# Patient Record
Sex: FEMALE | Race: ASIAN | Employment: FULL TIME | URBAN - METROPOLITAN AREA
[De-identification: names, ages, dates, MRNs, and addresses within clinical notes are randomized per-mention and may not be internally consistent; named-entity substitution may affect disease eponyms.]

---

## 2017-07-26 ENCOUNTER — OFFICE VISIT (OUTPATIENT)
Dept: FAMILY MEDICINE CLINIC | Facility: CLINIC | Age: 37
End: 2017-07-26

## 2017-07-26 ENCOUNTER — LAB ENCOUNTER (OUTPATIENT)
Dept: LAB | Age: 37
End: 2017-07-26
Attending: FAMILY MEDICINE
Payer: COMMERCIAL

## 2017-07-26 ENCOUNTER — TELEPHONE (OUTPATIENT)
Dept: FAMILY MEDICINE CLINIC | Facility: CLINIC | Age: 37
End: 2017-07-26

## 2017-07-26 VITALS
DIASTOLIC BLOOD PRESSURE: 63 MMHG | HEIGHT: 59 IN | TEMPERATURE: 98 F | WEIGHT: 119 LBS | SYSTOLIC BLOOD PRESSURE: 93 MMHG | HEART RATE: 80 BPM | BODY MASS INDEX: 23.99 KG/M2

## 2017-07-26 DIAGNOSIS — Z00.00 PHYSICAL EXAM, ROUTINE: ICD-10-CM

## 2017-07-26 DIAGNOSIS — Z00.00 PHYSICAL EXAM, ROUTINE: Primary | ICD-10-CM

## 2017-07-26 LAB
ALBUMIN SERPL BCP-MCNC: 3.8 G/DL (ref 3.5–4.8)
ALBUMIN/GLOB SERPL: 1.2 {RATIO} (ref 1–2)
ALP SERPL-CCNC: 46 U/L (ref 32–100)
ALT SERPL-CCNC: 21 U/L (ref 14–54)
ANION GAP SERPL CALC-SCNC: 7 MMOL/L (ref 0–18)
AST SERPL-CCNC: 25 U/L (ref 15–41)
BASOPHILS # BLD: 0 K/UL (ref 0–0.2)
BASOPHILS NFR BLD: 1 %
BILIRUB SERPL-MCNC: 0.4 MG/DL (ref 0.3–1.2)
BUN SERPL-MCNC: 8 MG/DL (ref 8–20)
BUN/CREAT SERPL: 11.6 (ref 10–20)
CALCIUM SERPL-MCNC: 9.2 MG/DL (ref 8.5–10.5)
CHLORIDE SERPL-SCNC: 103 MMOL/L (ref 95–110)
CHOLEST SERPL-MCNC: 236 MG/DL (ref 110–200)
CO2 SERPL-SCNC: 27 MMOL/L (ref 22–32)
CREAT SERPL-MCNC: 0.69 MG/DL (ref 0.5–1.5)
EOSINOPHIL # BLD: 0.3 K/UL (ref 0–0.7)
EOSINOPHIL NFR BLD: 6 %
ERYTHROCYTE [DISTWIDTH] IN BLOOD BY AUTOMATED COUNT: 14.2 % (ref 11–15)
GLOBULIN PLAS-MCNC: 3.2 G/DL (ref 2.5–3.7)
GLUCOSE SERPL-MCNC: 91 MG/DL (ref 70–99)
HCT VFR BLD AUTO: 39.3 % (ref 35–48)
HDLC SERPL-MCNC: 55 MG/DL
HGB BLD-MCNC: 12.3 G/DL (ref 12–16)
LDLC SERPL CALC-MCNC: 168 MG/DL (ref 0–99)
LYMPHOCYTES # BLD: 1.8 K/UL (ref 1–4)
LYMPHOCYTES NFR BLD: 36 %
MCH RBC QN AUTO: 25.3 PG (ref 27–32)
MCHC RBC AUTO-ENTMCNC: 31.4 G/DL (ref 32–37)
MCV RBC AUTO: 80.6 FL (ref 80–100)
MONOCYTES # BLD: 0.5 K/UL (ref 0–1)
MONOCYTES NFR BLD: 10 %
NEUTROPHILS # BLD AUTO: 2.3 K/UL (ref 1.8–7.7)
NEUTROPHILS NFR BLD: 47 %
NONHDLC SERPL-MCNC: 181 MG/DL
OSMOLALITY UR CALC.SUM OF ELEC: 282 MOSM/KG (ref 275–295)
PLATELET # BLD AUTO: 241 K/UL (ref 140–400)
PMV BLD AUTO: 9.2 FL (ref 7.4–10.3)
POTASSIUM SERPL-SCNC: 4.4 MMOL/L (ref 3.3–5.1)
PROT SERPL-MCNC: 7 G/DL (ref 5.9–8.4)
RBC # BLD AUTO: 4.88 M/UL (ref 3.7–5.4)
SODIUM SERPL-SCNC: 137 MMOL/L (ref 136–144)
TRIGL SERPL-MCNC: 63 MG/DL (ref 1–149)
WBC # BLD AUTO: 4.8 K/UL (ref 4–11)

## 2017-07-26 PROCEDURE — 36415 COLL VENOUS BLD VENIPUNCTURE: CPT

## 2017-07-26 PROCEDURE — 80053 COMPREHEN METABOLIC PANEL: CPT

## 2017-07-26 PROCEDURE — 80061 LIPID PANEL: CPT

## 2017-07-26 PROCEDURE — 99395 PREV VISIT EST AGE 18-39: CPT | Performed by: FAMILY MEDICINE

## 2017-07-26 PROCEDURE — 85025 COMPLETE CBC W/AUTO DIFF WBC: CPT

## 2017-07-26 RX ORDER — ALBUTEROL SULFATE 90 UG/1
2 AEROSOL, METERED RESPIRATORY (INHALATION) EVERY 4 HOURS PRN
Qty: 3 INHALER | Refills: 1 | Status: SHIPPED | OUTPATIENT
Start: 2017-07-26 | End: 2020-11-16

## 2017-07-26 NOTE — TELEPHONE ENCOUNTER
Pharmacy asking to clarify directions , stts it should be one puff not two.        Medication Quantity Refills Start End   Mometasone Furoate (ASMANEX 30 METERED DOSES) 110 MCG/INH Inhalation Aerosol Powder, Breath Activated 3 Inhaler 1 7/26/2017    Sig :

## 2017-07-28 NOTE — PROGRESS NOTES
HPI:    Patient ID: Huma Chen is a 44year old female. Here for physical.   Also states that asthma not being controlled. Well.  Patient exercises daily and states that especially having trouble then with sob and albuterol not being enough, dry.              ASSESSMENT/PLAN:   Physical exam, routine  (primary encounter diagnosis)  Asthma not controlled - start asmanex daily and increase asmanex dose daily    Orders Placed This Encounter      Lipid Panel [E]      Comp Metabolic Panel (14) [E]

## 2017-10-31 ENCOUNTER — OFFICE VISIT (OUTPATIENT)
Dept: FAMILY MEDICINE CLINIC | Facility: CLINIC | Age: 37
End: 2017-10-31

## 2017-10-31 ENCOUNTER — TELEPHONE (OUTPATIENT)
Dept: FAMILY MEDICINE CLINIC | Facility: CLINIC | Age: 37
End: 2017-10-31

## 2017-10-31 VITALS
BODY MASS INDEX: 24.39 KG/M2 | SYSTOLIC BLOOD PRESSURE: 99 MMHG | DIASTOLIC BLOOD PRESSURE: 64 MMHG | HEIGHT: 59 IN | HEART RATE: 78 BPM | TEMPERATURE: 98 F | WEIGHT: 121 LBS

## 2017-10-31 DIAGNOSIS — R30.0 DYSURIA: Primary | ICD-10-CM

## 2017-10-31 PROCEDURE — 99213 OFFICE O/P EST LOW 20 MIN: CPT | Performed by: FAMILY MEDICINE

## 2017-10-31 PROCEDURE — 81002 URINALYSIS NONAUTO W/O SCOPE: CPT | Performed by: FAMILY MEDICINE

## 2017-10-31 RX ORDER — CIPROFLOXACIN 250 MG/1
250 TABLET, FILM COATED ORAL 2 TIMES DAILY
Qty: 10 TABLET | Refills: 0 | Status: SHIPPED | OUTPATIENT
Start: 2017-10-31 | End: 2017-11-10

## 2017-10-31 NOTE — TELEPHONE ENCOUNTER
Patient in office today said she faxed over wellness screening form last week. I informed her I do not see it in [facility]'s fax folders. Will f/u w/ her regular nurse tomorrow.

## 2017-11-02 NOTE — PROGRESS NOTES
HPI:    Patient ID: Kristina Little is a 44year old female. Dysuria, frequency, some pelvic discomfort. No fever        Review of Systems   Constitutional: Negative. Negative for fatigue and fever. Respiratory: Negative.     Cardiovascular: Negative Referrals:  None       #[id number]

## 2017-11-06 NOTE — TELEPHONE ENCOUNTER
Pt called in to follow up on form if it was received. Pt states there is a deadline when the form can be submitted. Pt requesting a call back.

## 2017-11-08 NOTE — TELEPHONE ENCOUNTER
Pt call, but pt voice mail box is full not able to leave message. Pt 2017 health provide screening form was fax to [phone] 2010 confirmation received and the original copy when for scanning.

## 2017-11-13 NOTE — TELEPHONE ENCOUNTER
Pt states need to have the health provide screening form resubmitted due to Dr. BOGGS didn't sign or date the form and the deadline is 11/15/2017 and she wont be able to get her benefits. Pt states can she please get a call when completed.

## 2017-11-14 NOTE — TELEPHONE ENCOUNTER
LM- form was just missing date and NPI, filled in and faxed/confirmed to [phone]. Will leave copy at  for her to .

## 2018-01-11 ENCOUNTER — TELEPHONE (OUTPATIENT)
Dept: FAMILY MEDICINE CLINIC | Facility: CLINIC | Age: 38
End: 2018-01-11

## 2018-01-11 DIAGNOSIS — Z23 NEED FOR TDAP VACCINATION: Primary | ICD-10-CM

## 2018-01-11 NOTE — TELEPHONE ENCOUNTER
Pt notified she has not had Tdap w/ us. Pt doesn't believe she's had anywhere else either. NV scheduled for 1/12. Dr Gonzalez lynn for Tdap? See pended order.

## 2018-01-12 ENCOUNTER — NURSE ONLY (OUTPATIENT)
Dept: FAMILY MEDICINE CLINIC | Facility: CLINIC | Age: 38
End: 2018-01-12

## 2018-01-12 DIAGNOSIS — Z23 NEED FOR TDAP VACCINATION: Primary | ICD-10-CM

## 2018-01-12 PROCEDURE — 90471 IMMUNIZATION ADMIN: CPT | Performed by: FAMILY MEDICINE

## 2018-01-12 PROCEDURE — 90715 TDAP VACCINE 7 YRS/> IM: CPT | Performed by: FAMILY MEDICINE

## 2018-01-12 NOTE — PROGRESS NOTES
Patient presents to office for TDAP vaccine. Order placed prior to appointment by patient's PCP Dr. Nasir Mortensen. Patient tolerated injection well, no reactions. VIS provided at the time of visit.

## 2018-01-26 ENCOUNTER — IMMUNIZATION (OUTPATIENT)
Dept: FAMILY MEDICINE CLINIC | Facility: CLINIC | Age: 38
End: 2018-01-26

## 2018-01-26 DIAGNOSIS — Z23 NEED FOR VACCINATION: ICD-10-CM

## 2018-01-26 PROCEDURE — G0008 ADMIN INFLUENZA VIRUS VAC: HCPCS | Performed by: NURSE PRACTITIONER

## 2018-01-26 PROCEDURE — 90686 IIV4 VACC NO PRSV 0.5 ML IM: CPT | Performed by: NURSE PRACTITIONER

## 2018-10-17 ENCOUNTER — OFFICE VISIT (OUTPATIENT)
Dept: FAMILY MEDICINE CLINIC | Facility: CLINIC | Age: 38
End: 2018-10-17
Payer: COMMERCIAL

## 2018-10-17 VITALS
WEIGHT: 127.38 LBS | HEIGHT: 59.5 IN | DIASTOLIC BLOOD PRESSURE: 67 MMHG | SYSTOLIC BLOOD PRESSURE: 102 MMHG | HEART RATE: 82 BPM | BODY MASS INDEX: 25.34 KG/M2

## 2018-10-17 DIAGNOSIS — L65.9 HAIR LOSS: Primary | ICD-10-CM

## 2018-10-17 DIAGNOSIS — Z00.00 ANNUAL PHYSICAL EXAM: ICD-10-CM

## 2018-10-17 PROCEDURE — 99395 PREV VISIT EST AGE 18-39: CPT | Performed by: FAMILY MEDICINE

## 2018-10-19 ENCOUNTER — IMMUNIZATION (OUTPATIENT)
Dept: FAMILY MEDICINE CLINIC | Facility: CLINIC | Age: 38
End: 2018-10-19
Payer: COMMERCIAL

## 2018-10-19 ENCOUNTER — TELEPHONE (OUTPATIENT)
Dept: FAMILY MEDICINE CLINIC | Facility: CLINIC | Age: 38
End: 2018-10-19

## 2018-10-19 ENCOUNTER — LAB ENCOUNTER (OUTPATIENT)
Dept: LAB | Age: 38
End: 2018-10-19
Attending: FAMILY MEDICINE
Payer: COMMERCIAL

## 2018-10-19 DIAGNOSIS — Z23 NEED FOR VACCINATION: ICD-10-CM

## 2018-10-19 DIAGNOSIS — Z00.00 ANNUAL PHYSICAL EXAM: ICD-10-CM

## 2018-10-19 DIAGNOSIS — L65.9 HAIR LOSS: ICD-10-CM

## 2018-10-19 PROCEDURE — 90471 IMMUNIZATION ADMIN: CPT | Performed by: FAMILY MEDICINE

## 2018-10-19 PROCEDURE — 83735 ASSAY OF MAGNESIUM: CPT

## 2018-10-19 PROCEDURE — 80053 COMPREHEN METABOLIC PANEL: CPT

## 2018-10-19 PROCEDURE — 85025 COMPLETE CBC W/AUTO DIFF WBC: CPT

## 2018-10-19 PROCEDURE — 84443 ASSAY THYROID STIM HORMONE: CPT

## 2018-10-19 PROCEDURE — 36415 COLL VENOUS BLD VENIPUNCTURE: CPT

## 2018-10-19 PROCEDURE — 90686 IIV4 VACC NO PRSV 0.5 ML IM: CPT | Performed by: FAMILY MEDICINE

## 2018-10-19 PROCEDURE — 86038 ANTINUCLEAR ANTIBODIES: CPT

## 2018-10-19 PROCEDURE — 80061 LIPID PANEL: CPT

## 2018-10-19 PROCEDURE — 86039 ANTINUCLEAR ANTIBODIES (ANA): CPT

## 2018-10-23 NOTE — PROGRESS NOTES
HPI:    Patient ID: Janes Alvarado is a 43year old female. Here for physical. Doing well except has diffuse hair thinning. Review of Systems   Constitutional: Negative.   Negative for activity change, appetite change, chills, diaphoresis, fati dermatology for further work up   Orders Placed This Encounter      Comp Metabolic Panel (14) [E]      Lipid Panel [E]      TSH [E]      CBC W Differential W Platelet [E]      PARK, Direct Screen [E]      Magnesium [E]      Meds This Visit:  Requested Presc

## 2018-11-05 ENCOUNTER — TELEPHONE (OUTPATIENT)
Dept: OTHER | Age: 38
End: 2018-11-05

## 2018-11-05 DIAGNOSIS — R76.8 ANA POSITIVE: Primary | ICD-10-CM

## 2018-11-05 NOTE — TELEPHONE ENCOUNTER
Patient requesting Biometric screening form be faxed to her at [phone]. Please fax today ASAP and call Patient at [phone] to advise was faxed.

## 2018-11-05 NOTE — TELEPHONE ENCOUNTER
Verified pt name and . Pt asking for test results from 10/19/18. Reviewed TSH results as noted below. Pt would like all test results, noted some abnormalities on lipid panel, please advise. Pt would like to be contacted tomorrow, 18.     Notes re

## 2018-11-06 NOTE — TELEPHONE ENCOUNTER
Faxed form. fax was successful. Left form at [address] for patient to .

## 2018-11-08 NOTE — TELEPHONE ENCOUNTER
Notified patient results normal except manuel ( may be related to hair loss and Dr Keegan Fatima would like her to see rheumatologist ) referral ordered. Also lipid panel slightly elevated diet and exerxise recommended, no med at this time.  Patient had additional

## 2018-11-09 NOTE — TELEPHONE ENCOUNTER
pt wanted to know why her chol is high. She exercises and believes she eats well. She stated that she does not eat out and she eats homemade foods. Pt stated that they eat a lot of lamb red meat and do have a lot of rice.  She also mentioned that sh

## 2018-12-06 ENCOUNTER — TELEPHONE (OUTPATIENT)
Dept: FAMILY MEDICINE CLINIC | Facility: CLINIC | Age: 38
End: 2018-12-06

## 2018-12-06 DIAGNOSIS — Z00.00 ANNUAL PHYSICAL EXAM: ICD-10-CM

## 2018-12-06 DIAGNOSIS — R79.9 ABNORMAL BLOOD CHEMISTRY TEST: Primary | ICD-10-CM

## 2019-02-13 ENCOUNTER — OFFICE VISIT (OUTPATIENT)
Dept: RHEUMATOLOGY | Facility: CLINIC | Age: 39
End: 2019-02-13
Payer: COMMERCIAL

## 2019-02-13 ENCOUNTER — APPOINTMENT (OUTPATIENT)
Dept: LAB | Facility: HOSPITAL | Age: 39
End: 2019-02-13
Attending: INTERNAL MEDICINE
Payer: COMMERCIAL

## 2019-02-13 VITALS
BODY MASS INDEX: 24.51 KG/M2 | HEART RATE: 83 BPM | HEIGHT: 59.5 IN | SYSTOLIC BLOOD PRESSURE: 100 MMHG | DIASTOLIC BLOOD PRESSURE: 68 MMHG | WEIGHT: 123.19 LBS

## 2019-02-13 DIAGNOSIS — R20.2 NUMBNESS AND TINGLING IN LEFT ARM: ICD-10-CM

## 2019-02-13 DIAGNOSIS — R76.8 POSITIVE ANA (ANTINUCLEAR ANTIBODY): Primary | ICD-10-CM

## 2019-02-13 DIAGNOSIS — R76.8 POSITIVE ANA (ANTINUCLEAR ANTIBODY): ICD-10-CM

## 2019-02-13 DIAGNOSIS — R20.0 NUMBNESS AND TINGLING IN LEFT ARM: ICD-10-CM

## 2019-02-13 PROCEDURE — 86235 NUCLEAR ANTIGEN ANTIBODY: CPT

## 2019-02-13 PROCEDURE — 86225 DNA ANTIBODY NATIVE: CPT

## 2019-02-13 PROCEDURE — 99244 OFF/OP CNSLTJ NEW/EST MOD 40: CPT | Performed by: INTERNAL MEDICINE

## 2019-02-13 PROCEDURE — 99212 OFFICE O/P EST SF 10 MIN: CPT | Performed by: INTERNAL MEDICINE

## 2019-02-13 PROCEDURE — 36415 COLL VENOUS BLD VENIPUNCTURE: CPT
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
## 2019-02-13 NOTE — PATIENT INSTRUCTIONS
- you were seen today for +PARK which is normally seen in lupus  - lupus normally you see rash, oral ulcers, kidney issues, joint pain and swelling  - if your numbness and tingling persist in your L arm would recommend an xr of neck and even an EMG (nerve t

## 2019-02-15 LAB — DSDNA AB TITR SER: <10 {TITER}

## 2019-02-19 LAB
ENA SM IGG SER QL: NEGATIVE
ENA SM+RNP AB SER QL: NEGATIVE
ENA SS-A AB SER QL IA: NEGATIVE
ENA SS-B AB SER QL IA: NEGATIVE

## 2019-05-10 ENCOUNTER — NURSE TRIAGE (OUTPATIENT)
Dept: FAMILY MEDICINE CLINIC | Facility: CLINIC | Age: 39
End: 2019-05-10

## 2019-05-10 NOTE — TELEPHONE ENCOUNTER
Action Requested: Summary for Provider     []  Critical Lab, Recommendations Needed  [] Need Additional Advice  []   FYI    []   Need Orders  [] Need Medications Sent to Pharmacy  []  Other     SUMMARY:   Appointment was offered and refused.   The patient w

## 2019-05-10 NOTE — TELEPHONE ENCOUNTER
Rx: Methylprednisolone 4 MG Dosepk  Sig: Take 6 Tablets on Day 1 as directed on package and decrease by 1 Tab each Day A Total

## 2019-05-10 NOTE — TELEPHONE ENCOUNTER
Pt informed of [facility]'s response below. Pt declines OV; states will just try following recommendations from nurse she spoke with earlier today.

## 2019-06-14 ENCOUNTER — NURSE TRIAGE (OUTPATIENT)
Dept: FAMILY MEDICINE CLINIC | Facility: CLINIC | Age: 39
End: 2019-06-14

## 2019-06-14 RX ORDER — METHYLPREDNISOLONE 4 MG/1
TABLET ORAL
Qty: 1 KIT | Refills: 0 | Status: SHIPPED | OUTPATIENT
Start: 2019-06-14 | End: 2019-08-20 | Stop reason: ALTCHOICE

## 2019-06-14 NOTE — TELEPHONE ENCOUNTER
Spoke with the patient and informed her Dr. Jacky Bahena has sent an rx to the pharmacy for the Medrol Pack.  Patient voiced understanding and agreed with the plan of care but stated the pharmacy the prescription was sent to is out of the Medrol and they are tryi

## 2019-06-14 NOTE — TELEPHONE ENCOUNTER
Action Requested: Summary for Provider     []  Critical Lab, Recommendations Needed  [x] Need Additional Advice  []   FYI    []   Need Orders  [x] Need Medications Sent to Pharmacy  []  Other     SUMMARY: Patient is requesting tx for low back pain, towards

## 2019-08-20 ENCOUNTER — OFFICE VISIT (OUTPATIENT)
Dept: FAMILY MEDICINE CLINIC | Facility: CLINIC | Age: 39
End: 2019-08-20
Payer: COMMERCIAL

## 2019-08-20 VITALS
TEMPERATURE: 98 F | WEIGHT: 123 LBS | SYSTOLIC BLOOD PRESSURE: 96 MMHG | DIASTOLIC BLOOD PRESSURE: 64 MMHG | HEIGHT: 59 IN | OXYGEN SATURATION: 99 % | RESPIRATION RATE: 16 BRPM | HEART RATE: 66 BPM | BODY MASS INDEX: 24.8 KG/M2

## 2019-08-20 DIAGNOSIS — N30.01 ACUTE CYSTITIS WITH HEMATURIA: Primary | ICD-10-CM

## 2019-08-20 LAB
MULTISTIX LOT#: ABNORMAL NUMERIC
PH, URINE: 6 (ref 4.5–8)
SPECIFIC GRAVITY: 1.01 (ref 1–1.03)
URINE-COLOR: YELLOW
UROBILINOGEN,SEMI-QN: 0.2 MG/DL (ref 0–1.9)

## 2019-08-20 PROCEDURE — 81003 URINALYSIS AUTO W/O SCOPE: CPT | Performed by: NURSE PRACTITIONER

## 2019-08-20 PROCEDURE — 99213 OFFICE O/P EST LOW 20 MIN: CPT | Performed by: NURSE PRACTITIONER

## 2019-08-20 PROCEDURE — 87086 URINE CULTURE/COLONY COUNT: CPT | Performed by: NURSE PRACTITIONER

## 2019-08-20 RX ORDER — NITROFURANTOIN 25; 75 MG/1; MG/1
100 CAPSULE ORAL 2 TIMES DAILY
Qty: 10 CAPSULE | Refills: 0 | Status: SHIPPED | OUTPATIENT
Start: 2019-08-20 | End: 2019-08-25

## 2019-08-20 NOTE — PROGRESS NOTES
CHIEF COMPLAINT:   Patient presents with:  Urinary        HPI:   Grover Leos is a 35year old female presents with symptoms of UTI. Complaining of urinary frequency, urgency, dysuria, bladder pressure for last 6-7 days.   Symptoms have been mild since BP 96/64 (BP Location: Left arm, Patient Position: Sitting, Cuff Size: adult)   Pulse 66   Temp 98.3 °F (36.8 °C) (Oral)   Resp 16   Ht 59\"   Wt 123 lb   LMP 07/24/2019 (Exact Date)   SpO2 99%   BMI 24.84 kg/m²   GENERAL: well developed, well nourished,in Urine sent for culture and sensitivity. Will adjust antibiotic if needed. The patient is asked to return or follow up with PCP in 3 days if not better. Advised to seek immediate care for worsening symptoms.   The patient indicates understanding of thes - Go to Emergency Dept  for fever >101, any vomiting, or increase in abdominal, back, or flank pain, or no improvement after 48 hours of antibiotics.   -Schedule appt with PCP or gyne if symptoms persist after completion of antibiotics,  if negative urine c The most common cause of bladder infections is bacteria from the bowels. The bacteria get onto the skin around the opening of the urethra. From there, they can get into the urine and travel up to the bladder, causing inflammation and infection.  This usuall · Urinate more often. Don't try to hold urine in for a long time. · Wear loose-fitting clothes and cotton underwear. Avoid tight-fitting pants. · Improve your diet and prevent constipation.  Eat more fresh fruit and vegetables, and fiber, and less junk an

## 2019-08-20 NOTE — PATIENT INSTRUCTIONS
If a urine culture was sent out, we will contact you with the results in 48-72 hours via phone or [facility]. If positive, then we will call in an appropriate antibiotic or change antibiotic if needed.  If negative, then you should stop antibiotics as it i -Schedule appt with PCP or gyne if symptoms persist after completion of antibiotics,  if negative urine culture, if there is possibility of STD, if vaginal/penile discharge or other symptoms.       Bladder Infection, Female (Adult)    Urine is normally does The most common cause of bladder infections is bacteria from the bowels. The bacteria get onto the skin around the opening of the urethra. From there, they can get into the urine and travel up to the bladder, causing inflammation and infection.  This usuall · Urinate more often. Don't try to hold urine in for a long time. · Wear loose-fitting clothes and cotton underwear. Avoid tight-fitting pants. · Improve your diet and prevent constipation.  Eat more fresh fruit and vegetables, and fiber, and less junk an

## 2019-10-23 ENCOUNTER — TELEPHONE (OUTPATIENT)
Dept: FAMILY MEDICINE CLINIC | Facility: CLINIC | Age: 39
End: 2019-10-23

## 2019-11-07 ENCOUNTER — LAB ENCOUNTER (OUTPATIENT)
Dept: LAB | Age: 39
End: 2019-11-07
Attending: FAMILY MEDICINE
Payer: COMMERCIAL

## 2019-11-07 DIAGNOSIS — Z00.00 ANNUAL PHYSICAL EXAM: ICD-10-CM

## 2019-11-07 PROCEDURE — 85025 COMPLETE CBC W/AUTO DIFF WBC: CPT

## 2019-11-07 PROCEDURE — 84443 ASSAY THYROID STIM HORMONE: CPT

## 2019-11-07 PROCEDURE — 80053 COMPREHEN METABOLIC PANEL: CPT

## 2019-11-07 PROCEDURE — 80061 LIPID PANEL: CPT

## 2019-11-07 PROCEDURE — 36415 COLL VENOUS BLD VENIPUNCTURE: CPT

## 2019-11-08 ENCOUNTER — OFFICE VISIT (OUTPATIENT)
Dept: FAMILY MEDICINE CLINIC | Facility: CLINIC | Age: 39
End: 2019-11-08
Payer: COMMERCIAL

## 2019-11-08 VITALS
BODY MASS INDEX: 26 KG/M2 | DIASTOLIC BLOOD PRESSURE: 63 MMHG | HEIGHT: 59 IN | HEART RATE: 70 BPM | TEMPERATURE: 98 F | SYSTOLIC BLOOD PRESSURE: 96 MMHG | WEIGHT: 129 LBS

## 2019-11-08 DIAGNOSIS — Z00.00 ANNUAL PHYSICAL EXAM: Primary | ICD-10-CM

## 2019-11-08 PROCEDURE — 99396 PREV VISIT EST AGE 40-64: CPT | Performed by: FAMILY MEDICINE

## 2019-11-08 NOTE — PROGRESS NOTES
HPI:    Patient ID: Francis Camargo is a 35year old female. HPI    Review of Systems   Constitutional: Negative. Negative for activity change, diaphoresis and fever. HENT: Negative. Respiratory: Negative.   Negative for cough, chest tightness, sh confirmed negative in the left inguinal area. Genitourinary:    Vagina and uterus normal.   No breast swelling, tenderness or discharge. Uterus is not deviated, not enlarged, not fixed and not tender.  Cervix exhibits no motion tenderness, no discharge an

## 2019-11-12 ENCOUNTER — IMMUNIZATION (OUTPATIENT)
Dept: FAMILY MEDICINE CLINIC | Facility: CLINIC | Age: 39
End: 2019-11-12
Payer: COMMERCIAL

## 2019-11-12 DIAGNOSIS — Z23 NEED FOR VACCINATION: ICD-10-CM

## 2019-11-12 PROCEDURE — 90686 IIV4 VACC NO PRSV 0.5 ML IM: CPT | Performed by: FAMILY MEDICINE

## 2019-11-12 PROCEDURE — 90471 IMMUNIZATION ADMIN: CPT | Performed by: FAMILY MEDICINE

## 2019-11-13 NOTE — TELEPHONE ENCOUNTER
----- Message from Darlean Dance, MD sent at 11/13/2019 11:45 AM CST -----  Results normal. Please call patient and send results.  Continue present management

## 2019-12-21 ENCOUNTER — OFFICE VISIT (OUTPATIENT)
Dept: FAMILY MEDICINE CLINIC | Facility: CLINIC | Age: 39
End: 2019-12-21
Payer: COMMERCIAL

## 2019-12-21 VITALS
WEIGHT: 129 LBS | DIASTOLIC BLOOD PRESSURE: 58 MMHG | TEMPERATURE: 98 F | HEIGHT: 59 IN | RESPIRATION RATE: 14 BRPM | SYSTOLIC BLOOD PRESSURE: 96 MMHG | BODY MASS INDEX: 26 KG/M2 | HEART RATE: 77 BPM

## 2019-12-21 DIAGNOSIS — N30.01 ACUTE CYSTITIS WITH HEMATURIA: Primary | ICD-10-CM

## 2019-12-21 PROCEDURE — 87086 URINE CULTURE/COLONY COUNT: CPT | Performed by: NURSE PRACTITIONER

## 2019-12-21 PROCEDURE — 87186 SC STD MICRODIL/AGAR DIL: CPT | Performed by: NURSE PRACTITIONER

## 2019-12-21 PROCEDURE — 99213 OFFICE O/P EST LOW 20 MIN: CPT | Performed by: NURSE PRACTITIONER

## 2019-12-21 PROCEDURE — 81002 URINALYSIS NONAUTO W/O SCOPE: CPT | Performed by: NURSE PRACTITIONER

## 2019-12-21 PROCEDURE — 87077 CULTURE AEROBIC IDENTIFY: CPT | Performed by: NURSE PRACTITIONER

## 2019-12-21 RX ORDER — NITROFURANTOIN 25; 75 MG/1; MG/1
100 CAPSULE ORAL 2 TIMES DAILY
Qty: 14 CAPSULE | Refills: 0 | Status: SHIPPED | OUTPATIENT
Start: 2019-12-21 | End: 2019-12-28

## 2019-12-21 NOTE — PROGRESS NOTES
CHIEF COMPLAINT:   Patient presents with:  UTI      HPI:   Rudy Saldaña is a 35year old female who presents with symptoms of UTI.  Complaining of  mild dysuria for a few days possibly a week at the most. She also reports a odor to her urine > 2 weeks RRR, no murmurs  LUNGS: clear to ausculation bilaterally, no wheezing or rhonchi  GI: BS present x 4. No hepatosplenomegaly. : no suprapubic tenderness.  No bladder distention, or CVAT     Recent Results (from the past 24 hour(s))   URINALYSIS NONAUTO W

## 2020-04-15 ENCOUNTER — NURSE TRIAGE (OUTPATIENT)
Dept: OTHER | Age: 40
End: 2020-04-15

## 2020-04-15 DIAGNOSIS — J01.80 OTHER ACUTE SINUSITIS, RECURRENCE NOT SPECIFIED: Primary | ICD-10-CM

## 2020-04-15 PROCEDURE — 99213 OFFICE O/P EST LOW 20 MIN: CPT | Performed by: FAMILY MEDICINE

## 2020-04-15 RX ORDER — AMOXICILLIN 875 MG/1
875 TABLET, COATED ORAL 2 TIMES DAILY
Qty: 20 TABLET | Refills: 0 | Status: SHIPPED | OUTPATIENT
Start: 2020-04-15 | End: 2021-06-01

## 2020-04-15 NOTE — TELEPHONE ENCOUNTER
Action Requested: Summary for Provider     []  Critical Lab, Recommendations Needed  [x] Need Additional Advice  []   FYI    []   Need Orders  [x] Need Medications Sent to Pharmacy  []  Other     SUMMARY: patient calling with left sided, temporal headache

## 2020-04-15 NOTE — TELEPHONE ENCOUNTER
Virtual Telephone Check-In    Cely Lechuga verbally consents to a Virtual/Telephone Check-In visit on 04/15/20. Patient understands and accepts financial responsibility for any deductible, co-insurance and/or co-pays associated with this service.

## 2020-07-25 ENCOUNTER — NURSE TRIAGE (OUTPATIENT)
Dept: FAMILY MEDICINE CLINIC | Facility: CLINIC | Age: 40
End: 2020-07-25

## 2020-07-25 RX ORDER — METHYLPREDNISOLONE 4 MG/1
TABLET ORAL
Qty: 1 KIT | Refills: 0 | Status: SHIPPED | OUTPATIENT
Start: 2020-07-25 | End: 2021-06-01

## 2020-07-25 NOTE — TELEPHONE ENCOUNTER
Action Requested: Summary for Provider     []  Critical Lab, Recommendations Needed  [] Need Additional Advice  []   FYI    []   Need Orders  [] Need Medications Sent to Pharmacy  []  Other     SUMMARY: Dr Sybil Pablo for [facility]: patient seeking rx for medrol deion.

## 2020-07-25 NOTE — TELEPHONE ENCOUNTER
Medrol dose pack sent in by provider. Patient informed. Verbalized understanding. methylPREDNISolone (MEDROL) 4 MG Oral Tablet Therapy Pack 1 kit 0 7/25/2020     Sig: As directed.     Sent to pharmacy as: methylPREDNISolone 4 MG Oral Tablet Therapy Pack

## 2020-10-15 ENCOUNTER — IMMUNIZATION (OUTPATIENT)
Dept: FAMILY MEDICINE CLINIC | Facility: CLINIC | Age: 40
End: 2020-10-15
Payer: COMMERCIAL

## 2020-10-15 PROCEDURE — 90686 IIV4 VACC NO PRSV 0.5 ML IM: CPT | Performed by: NURSE PRACTITIONER

## 2020-10-15 PROCEDURE — 90471 IMMUNIZATION ADMIN: CPT | Performed by: NURSE PRACTITIONER

## 2020-11-10 ENCOUNTER — LAB ENCOUNTER (OUTPATIENT)
Dept: LAB | Age: 40
End: 2020-11-10
Attending: FAMILY MEDICINE
Payer: COMMERCIAL

## 2020-11-10 ENCOUNTER — OFFICE VISIT (OUTPATIENT)
Dept: FAMILY MEDICINE CLINIC | Facility: CLINIC | Age: 40
End: 2020-11-10
Payer: COMMERCIAL

## 2020-11-10 VITALS
WEIGHT: 127 LBS | HEART RATE: 72 BPM | HEIGHT: 59 IN | DIASTOLIC BLOOD PRESSURE: 57 MMHG | BODY MASS INDEX: 25.6 KG/M2 | SYSTOLIC BLOOD PRESSURE: 99 MMHG

## 2020-11-10 DIAGNOSIS — Z00.00 ANNUAL PHYSICAL EXAM: Primary | ICD-10-CM

## 2020-11-10 DIAGNOSIS — Z12.31 SCREENING MAMMOGRAM, ENCOUNTER FOR: ICD-10-CM

## 2020-11-10 DIAGNOSIS — Z12.39 ENCOUNTER FOR SCREENING BREAST EXAMINATION: ICD-10-CM

## 2020-11-10 DIAGNOSIS — Z00.00 ANNUAL PHYSICAL EXAM: ICD-10-CM

## 2020-11-10 PROCEDURE — 3078F DIAST BP <80 MM HG: CPT | Performed by: FAMILY MEDICINE

## 2020-11-10 PROCEDURE — 3074F SYST BP LT 130 MM HG: CPT | Performed by: FAMILY MEDICINE

## 2020-11-10 PROCEDURE — 36415 COLL VENOUS BLD VENIPUNCTURE: CPT

## 2020-11-10 PROCEDURE — 80053 COMPREHEN METABOLIC PANEL: CPT

## 2020-11-10 PROCEDURE — 80061 LIPID PANEL: CPT

## 2020-11-10 PROCEDURE — 99396 PREV VISIT EST AGE 40-64: CPT | Performed by: FAMILY MEDICINE

## 2020-11-10 PROCEDURE — 99072 ADDL SUPL MATRL&STAF TM PHE: CPT | Performed by: FAMILY MEDICINE

## 2020-11-10 PROCEDURE — 85025 COMPLETE CBC W/AUTO DIFF WBC: CPT

## 2020-11-10 PROCEDURE — 3008F BODY MASS INDEX DOCD: CPT | Performed by: FAMILY MEDICINE

## 2020-11-10 NOTE — PROGRESS NOTES
HPI:    Patient ID: Jolly Live is a 38year old female. Doing well. No complaints  HPI    Review of Systems  Constitutional: Negative. Negative for activity change, appetite change, diaphoresis and fatigue. Respiratory: Negative.   Negative for Referrals:  SAMMIE SCREENING BILAT ([id number])       [id number]

## 2020-11-12 ENCOUNTER — TELEPHONE (OUTPATIENT)
Dept: FAMILY MEDICINE CLINIC | Facility: CLINIC | Age: 40
End: 2020-11-12

## 2020-11-12 NOTE — TELEPHONE ENCOUNTER
Pt states she was seen in office on 11/10/2020 and gave biometric form to be completed by doctor. States information needs to be faxed by tomorrow. Pt also asking for test results. Please advise.

## 2020-11-16 ENCOUNTER — TELEPHONE (OUTPATIENT)
Dept: FAMILY MEDICINE CLINIC | Facility: CLINIC | Age: 40
End: 2020-11-16

## 2020-11-16 DIAGNOSIS — E78.5 HYPERLIPIDEMIA, UNSPECIFIED HYPERLIPIDEMIA TYPE: Primary | ICD-10-CM

## 2020-11-16 RX ORDER — ATORVASTATIN CALCIUM 10 MG/1
10 TABLET, FILM COATED ORAL DAILY
Qty: 90 TABLET | Refills: 1 | Status: SHIPPED | OUTPATIENT
Start: 2020-11-16 | End: 2021-11-10

## 2020-11-16 RX ORDER — MOMETASONE FUROATE 110 UG/1
2 INHALANT RESPIRATORY (INHALATION) 2 TIMES DAILY
Qty: 3 INHALER | Refills: 1 | Status: SHIPPED | OUTPATIENT
Start: 2020-11-16 | End: 2021-11-10

## 2020-11-16 RX ORDER — ALBUTEROL SULFATE 90 UG/1
2 AEROSOL, METERED RESPIRATORY (INHALATION) EVERY 4 HOURS PRN
Qty: 3 INHALER | Refills: 1 | Status: SHIPPED | OUTPATIENT
Start: 2020-11-16 | End: 2021-11-10

## 2020-11-26 ENCOUNTER — TELEPHONE (OUTPATIENT)
Dept: FAMILY MEDICINE CLINIC | Facility: CLINIC | Age: 40
End: 2020-11-26

## 2020-11-26 RX ORDER — NITROFURANTOIN 25; 75 MG/1; MG/1
100 CAPSULE ORAL 2 TIMES DAILY
Qty: 14 CAPSULE | Refills: 0 | Status: SHIPPED | OUTPATIENT
Start: 2020-11-26 | End: 2021-06-01

## 2020-11-26 NOTE — TELEPHONE ENCOUNTER
Patient paged provider at 10:05 a.m. for UTI symptoms. She has discomfort while urinating. Frequency of urination. No fever/chills. No hematuria noted. No flank/back pain. No allergies to medications noted.  She would like an antibiotics sent over as she ge

## 2020-12-04 ENCOUNTER — TELEPHONE (OUTPATIENT)
Dept: FAMILY MEDICINE CLINIC | Facility: CLINIC | Age: 40
End: 2020-12-04

## 2020-12-04 NOTE — TELEPHONE ENCOUNTER
Pharmacist from [facility] calling to clarify if you would like the Asmanex twisthaler or HFA inhaler for this pt.      Please advise

## 2020-12-22 ENCOUNTER — HOSPITAL ENCOUNTER (OUTPATIENT)
Dept: MAMMOGRAPHY | Age: 40
Discharge: HOME | End: 2020-12-22
Attending: FAMILY MEDICINE
Payer: COMMERCIAL

## 2020-12-22 DIAGNOSIS — Z12.31 SCREENING MAMMOGRAM, ENCOUNTER FOR: ICD-10-CM

## 2020-12-22 PROCEDURE — 77063 BREAST TOMOSYNTHESIS BI: CPT | Performed by: FAMILY MEDICINE

## 2020-12-22 PROCEDURE — 77067 SCR MAMMO BI INCL CAD: CPT | Performed by: FAMILY MEDICINE

## 2021-03-19 ENCOUNTER — NURSE TRIAGE (OUTPATIENT)
Dept: FAMILY MEDICINE CLINIC | Facility: CLINIC | Age: 41
End: 2021-03-19

## 2021-03-19 RX ORDER — METHYLPREDNISOLONE 4 MG/1
TABLET ORAL
Qty: 1 KIT | Refills: 0 | Status: SHIPPED | OUTPATIENT
Start: 2021-03-19 | End: 2021-06-01

## 2021-03-19 NOTE — TELEPHONE ENCOUNTER
pt stated that she has a rash and it started three days ago the rash is on her hands, legs ,arms . Not in the face, chest, or stomach area. The rash is modearly itchy and a little red. Rash is not raised.  Pt stated that she is allergic to shellfi

## 2021-03-20 ENCOUNTER — HOSPITAL ENCOUNTER (OUTPATIENT)
Age: 41
Discharge: HOME | End: 2021-03-20
Payer: COMMERCIAL

## 2021-03-20 VITALS
SYSTOLIC BLOOD PRESSURE: 119 MMHG | RESPIRATION RATE: 16 BRPM | TEMPERATURE: 98 F | DIASTOLIC BLOOD PRESSURE: 63 MMHG | HEART RATE: 80 BPM | OXYGEN SATURATION: 100 %

## 2021-03-20 DIAGNOSIS — R21 RASH OF BODY: Primary | ICD-10-CM

## 2021-03-20 DIAGNOSIS — L50.9 URTICARIA: ICD-10-CM

## 2021-03-20 PROCEDURE — 99213 OFFICE O/P EST LOW 20 MIN: CPT | Performed by: NURSE PRACTITIONER

## 2021-03-20 RX ORDER — PREDNISONE 20 MG/1
40 TABLET ORAL DAILY
Qty: 8 TABLET | Refills: 0 | Status: SHIPPED | OUTPATIENT
Start: 2021-03-21 | End: 2021-03-25

## 2021-03-20 RX ORDER — PREDNISONE 20 MG/1
60 TABLET ORAL ONCE
Status: COMPLETED | OUTPATIENT
Start: 2021-03-20 | End: 2021-03-20

## 2021-03-20 RX ORDER — EPINEPHRINE 0.3 MG/.3ML
0.3 INJECTION SUBCUTANEOUS ONCE AS NEEDED
Qty: 1 EACH | Refills: 0 | Status: SHIPPED | OUTPATIENT
Start: 2021-03-20 | End: 2021-03-20

## 2021-03-20 NOTE — TELEPHONE ENCOUNTER
Patient waiting for call back. Patient is requesting message to be sent to  and to speak with someone. Had Rock Dewayne shot 2/28/21, 1st dose and is wondering if this rash to her body is related. Patient also advised to UC or ER for her symptoms.

## 2021-03-20 NOTE — ED PROVIDER NOTES
Patient Seen in: [facility]      History   Patient presents with:  Rash Skin Problem    Stated Complaint: Rash    HPI/Subjective:   HPI    This is a 36-year female presenting for rash. Patient states, 7 days ago she developed a rash.   Monika °C) (Temporal)   Resp 16   LMP 03/01/2021 (Exact Date)   SpO2 100%         Physical Exam  Vitals and nursing note reviewed. Constitutional:       Appearance: Normal appearance. HENT:      Head: Normocephalic.       Right Ear: External ear normal.      L instructions.                          Disposition and Plan     Clinical Impression:  Rash of body  (primary encounter diagnosis)  Urticaria    Disposition:  Discharge  3/20/2021  2:24 pm    Follow-up:  Swathi Gardner, [id number] [id number]

## 2021-06-01 ENCOUNTER — OFFICE VISIT (OUTPATIENT)
Dept: FAMILY MEDICINE CLINIC | Facility: CLINIC | Age: 41
End: 2021-06-01
Payer: COMMERCIAL

## 2021-06-01 VITALS
WEIGHT: 123 LBS | OXYGEN SATURATION: 100 % | HEIGHT: 59 IN | TEMPERATURE: 97 F | RESPIRATION RATE: 18 BRPM | SYSTOLIC BLOOD PRESSURE: 105 MMHG | BODY MASS INDEX: 24.8 KG/M2 | DIASTOLIC BLOOD PRESSURE: 64 MMHG | HEART RATE: 68 BPM

## 2021-06-01 DIAGNOSIS — N30.01 ACUTE CYSTITIS WITH HEMATURIA: Primary | ICD-10-CM

## 2021-06-01 DIAGNOSIS — R39.9 UTI SYMPTOMS: ICD-10-CM

## 2021-06-01 PROCEDURE — 3078F DIAST BP <80 MM HG: CPT | Performed by: NURSE PRACTITIONER

## 2021-06-01 PROCEDURE — 99213 OFFICE O/P EST LOW 20 MIN: CPT | Performed by: NURSE PRACTITIONER

## 2021-06-01 PROCEDURE — 81003 URINALYSIS AUTO W/O SCOPE: CPT | Performed by: NURSE PRACTITIONER

## 2021-06-01 PROCEDURE — 87086 URINE CULTURE/COLONY COUNT: CPT | Performed by: NURSE PRACTITIONER

## 2021-06-01 PROCEDURE — 3074F SYST BP LT 130 MM HG: CPT | Performed by: NURSE PRACTITIONER

## 2021-06-01 PROCEDURE — 3008F BODY MASS INDEX DOCD: CPT | Performed by: NURSE PRACTITIONER

## 2021-06-01 RX ORDER — NITROFURANTOIN 25; 75 MG/1; MG/1
100 CAPSULE ORAL 2 TIMES DAILY
Qty: 14 CAPSULE | Refills: 0 | Status: SHIPPED | OUTPATIENT
Start: 2021-06-01 | End: 2021-06-08

## 2021-06-01 NOTE — PROGRESS NOTES
CHIEF COMPLAINT:   Patient presents with:  UTI: X 2 days, frquency and urgency,  no dysuria. No fever, back pain, or nausea      HPI:   Aura Padilla is a 42year old female who presents with symptoms of UTI.      Complaining of urinary frequency and Use      Vaping Use: Never used    Alcohol use: Yes      Comment: rarely    Drug use: No        REVIEW OF SYSTEMS:   GENERAL: Denies fever, chills, body aches, fatigue, weight loss, or night sweats  SKIN: no rashes, no skin wounds or ulcers.   EYES:denies b Meds & Refills for this Visit:  Requested Prescriptions     Signed Prescriptions Disp Refills   • Nitrofurantoin Monohyd Macro 100 MG Oral Cap 14 capsule 0     Sig: Take 1 capsule (100 mg total) by mouth 2 (two) times daily for 7 days.        Will send parts of the body. Once they are in your urinary tract, they can cause infection in these areas:  · The urethra (urethritis)  · The bladder (cystitis)  · The kidneys (pyelonephritis)  The most common place for an infection is in the bladder.  This is called (dehydration)  · Constipation  · Having sex  · Using a diaphragm for birth control   Treatment  Bladder infections are diagnosed by a urine test and urine culture. They are treated with antibiotics. They often clear up quickly without problems.  Treatment h birth control pills and have frequent bladder infections, discuss it with your healthcare provider. Follow-up care  Call your healthcare provider if all symptoms are not gone after 3 days of treatment.  This is especially important if you have repeat infec take a dose on time, take it as soon as you remember. If it is almost time for the next dose, do not take the missed dose. Return to your normal dosing schedule. Do not take 2 doses of this medicine at one time.   Please tell your doctor and pharmacist abou about all the medicines you take. Do not start or stop any other medicines without first speaking to your doctor or pharmacist.  Call your doctor right away if you notice any unusual bleeding or bruising.   Do not share this medicine with anyone who has no this code on your smartphone or tablet or use the web address below. You can also ask your pharmacist for a printout.  If you have any questions, please ask your pharmacist.   © 2021 [address] patient indicates understanding of t

## 2021-06-01 NOTE — PATIENT INSTRUCTIONS
· Complete full course of antibiotics. · We will call you in 2-3 days with the results of your urine culture.   · Over the counter Tylenol (acetaminophen) or Advil/Motrin (ibuprofen) can be taken according to package instructions as needed for pain/discomf cystitis is an infection. Symptoms  The infection causes inflammation in the urethra and bladder. This causes many of the symptoms.  The most common symptoms of a bladder infection are:  · Pain or burning when urinating  · Having to urinate more often than have long-term (chronic) liver or kidney disease, talk with your healthcare provider before using these medicines. Also talk with your provider if you've ever had a stomach ulcer or GI (gastrointestinal) bleeding, or are taking blood-thinner medicines.   · rate  When to get medical advice  Call your healthcare provider right away if any of these occur:  · Fever of 100.4ºF (38. 0ºC) or higher, or as directed by your healthcare provider  · Symptoms are not better after 3 days of treatment  · Back or belly pain is prescribed. Please do not stop the medicine even if you start to feel better after the first few days. Cautions  Tell your doctor and pharmacist if you ever had an allergic reaction to a medicine.  Symptoms of an allergic reaction can include trouble br infection  · vomiting  Call your doctor or get medical help right away if you notice any of these more serious side effects:  · unusual bruising or discoloration on skin  · chest pain  · confusion  · severe, watery or bloody diarrhea  · fever or chills  ·

## 2021-08-17 ENCOUNTER — TELEMEDICINE (OUTPATIENT)
Dept: FAMILY MEDICINE CLINIC | Facility: CLINIC | Age: 41
End: 2021-08-17

## 2021-08-17 DIAGNOSIS — B34.9 VIRAL SYNDROME: Primary | ICD-10-CM

## 2021-08-17 PROCEDURE — 99213 OFFICE O/P EST LOW 20 MIN: CPT | Performed by: FAMILY MEDICINE

## 2021-08-17 RX ORDER — MONTELUKAST SODIUM 10 MG/1
10 TABLET ORAL DAILY
Qty: 30 TABLET | Refills: 1 | Status: SHIPPED | OUTPATIENT
Start: 2021-08-17 | End: 2021-09-08

## 2021-08-17 RX ORDER — AZITHROMYCIN 250 MG/1
TABLET, FILM COATED ORAL
Qty: 6 TABLET | Refills: 0 | Status: SHIPPED | OUTPATIENT
Start: 2021-08-17 | End: 2021-08-22

## 2021-08-17 NOTE — PROGRESS NOTES
HPI/Subjective:   Patient ID: Mary Adams is a 42year old female.   Telehealth outside of [facility] [address] Verbal Consent   I conducted a telehealth visit with Mary Adams today, 08/17/21, which was completed using two-way, real-time interacti feels slightly sick also. History/Other:   Review of Systems     Constitutional: Negative. Negative for activity change, appetite change, diaphoresis and fatigue. hEENT see above   Respiratory: see above Cardiovascular: Negative.   Negative for guera

## 2021-09-08 RX ORDER — MONTELUKAST SODIUM 10 MG/1
10 TABLET ORAL DAILY
Qty: 30 TABLET | Refills: 1 | Status: SHIPPED | OUTPATIENT
Start: 2021-09-08 | End: 2021-11-10

## 2021-09-08 NOTE — TELEPHONE ENCOUNTER
Refill passed per [facility] protocol.     Requested Prescriptions   Pending Prescriptions Disp Refills    MONTELUKAST 10 MG Oral Tab [Pharmacy Med Name: MONTELUKAST SOD 10 MG TABLET] 30 tablet 1     Sig: TAKE 1 TABLET BY MOUTH EVERY DAY        Asthma

## 2021-09-22 ENCOUNTER — TELEPHONE (OUTPATIENT)
Dept: FAMILY MEDICINE CLINIC | Facility: CLINIC | Age: 41
End: 2021-09-22

## 2021-09-29 ENCOUNTER — OFFICE VISIT (OUTPATIENT)
Dept: FAMILY MEDICINE CLINIC | Facility: CLINIC | Age: 41
End: 2021-09-29
Payer: COMMERCIAL

## 2021-09-29 VITALS
BODY MASS INDEX: 24.19 KG/M2 | HEART RATE: 79 BPM | WEIGHT: 120 LBS | SYSTOLIC BLOOD PRESSURE: 100 MMHG | DIASTOLIC BLOOD PRESSURE: 66 MMHG | HEIGHT: 59 IN | RESPIRATION RATE: 16 BRPM

## 2021-09-29 DIAGNOSIS — M79.631 RIGHT FOREARM PAIN: Primary | ICD-10-CM

## 2021-09-29 PROCEDURE — 3008F BODY MASS INDEX DOCD: CPT | Performed by: FAMILY MEDICINE

## 2021-09-29 PROCEDURE — A4467 BELT STRAP SLEEV GRMNT COVER: HCPCS | Performed by: FAMILY MEDICINE

## 2021-09-29 PROCEDURE — 3078F DIAST BP <80 MM HG: CPT | Performed by: FAMILY MEDICINE

## 2021-09-29 PROCEDURE — 3074F SYST BP LT 130 MM HG: CPT | Performed by: FAMILY MEDICINE

## 2021-09-29 PROCEDURE — 99213 OFFICE O/P EST LOW 20 MIN: CPT | Performed by: FAMILY MEDICINE

## 2021-09-29 RX ORDER — MELOXICAM 7.5 MG/1
7.5 TABLET ORAL DAILY
Qty: 30 TABLET | Refills: 0 | Status: SHIPPED | OUTPATIENT
Start: 2021-09-29 | End: 2021-10-28

## 2021-09-29 NOTE — PROGRESS NOTES
Subjective:   Patient ID: Janes Alvarado is a 42year old female. Has pain in the forearm right side.  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

## 2021-10-28 RX ORDER — MELOXICAM 7.5 MG/1
7.5 TABLET ORAL DAILY
Qty: 30 TABLET | Refills: 0 | Status: SHIPPED | OUTPATIENT
Start: 2021-10-28 | End: 2021-11-10

## 2021-10-28 NOTE — TELEPHONE ENCOUNTER
Refill passed per [place] [employer] [facility] protocol.     Requested Prescriptions   Pending Prescriptions Disp Refills    MELOXICAM 7.5 MG Oral Tab [Pharmacy Med Name: MELOXICAM 7.5 MG TABLET] 30 tablet 0     Sig: TAKE 1 TABLET BY MOUTH EVERY DAY        Non-Narcotic Pain Medication Protocol Passed - 10/28/2021 12:00 AM        Passed - Appointment in past 6 or next 3 months              Future Appointments         Provider Department Appt Notes    In 2 weeks Malinda Dorantes MD [facility] [address] [facility] Cholesterol            Recent Outpatient Visits              4 weeks ago Right forearm pain    Noah Pederson MD    Office Visit    2 months ago Viral syndrome    [facility] [address] [facility]    Telemedicine    4 months ago Acute cystitis with hematuria    [facility] FRANKIE Villa    Office Visit    11 months ago Annual physical exam    Noah Pederson MD    Office Visit    1 year ago Acute cystitis with hematuria    [address] Kaleb Barajas APRN    Office Visit

## 2021-11-10 ENCOUNTER — LAB ENCOUNTER (OUTPATIENT)
Dept: LAB | Age: 41
End: 2021-11-10
Attending: FAMILY MEDICINE
Payer: COMMERCIAL

## 2021-11-10 ENCOUNTER — OFFICE VISIT (OUTPATIENT)
Dept: FAMILY MEDICINE CLINIC | Facility: CLINIC | Age: 41
End: 2021-11-10
Payer: COMMERCIAL

## 2021-11-10 VITALS
HEART RATE: 68 BPM | WEIGHT: 131 LBS | HEIGHT: 59 IN | BODY MASS INDEX: 26.41 KG/M2 | SYSTOLIC BLOOD PRESSURE: 88 MMHG | DIASTOLIC BLOOD PRESSURE: 58 MMHG

## 2021-11-10 DIAGNOSIS — E78.5 HYPERLIPIDEMIA, UNSPECIFIED HYPERLIPIDEMIA TYPE: ICD-10-CM

## 2021-11-10 DIAGNOSIS — Z00.00 ANNUAL PHYSICAL EXAM: ICD-10-CM

## 2021-11-10 DIAGNOSIS — Z00.00 ANNUAL PHYSICAL EXAM: Primary | ICD-10-CM

## 2021-11-10 PROCEDURE — 84443 ASSAY THYROID STIM HORMONE: CPT

## 2021-11-10 PROCEDURE — 82248 BILIRUBIN DIRECT: CPT

## 2021-11-10 PROCEDURE — 36415 COLL VENOUS BLD VENIPUNCTURE: CPT

## 2021-11-10 PROCEDURE — 3074F SYST BP LT 130 MM HG: CPT | Performed by: FAMILY MEDICINE

## 2021-11-10 PROCEDURE — 85025 COMPLETE CBC W/AUTO DIFF WBC: CPT

## 2021-11-10 PROCEDURE — 3008F BODY MASS INDEX DOCD: CPT | Performed by: FAMILY MEDICINE

## 2021-11-10 PROCEDURE — 99396 PREV VISIT EST AGE 40-64: CPT | Performed by: FAMILY MEDICINE

## 2021-11-10 PROCEDURE — 3078F DIAST BP <80 MM HG: CPT | Performed by: FAMILY MEDICINE

## 2021-11-10 PROCEDURE — 90471 IMMUNIZATION ADMIN: CPT | Performed by: FAMILY MEDICINE

## 2021-11-10 PROCEDURE — 80061 LIPID PANEL: CPT

## 2021-11-10 PROCEDURE — 80053 COMPREHEN METABOLIC PANEL: CPT

## 2021-11-10 PROCEDURE — 90686 IIV4 VACC NO PRSV 0.5 ML IM: CPT | Performed by: FAMILY MEDICINE

## 2021-11-10 RX ORDER — MONTELUKAST SODIUM 10 MG/1
10 TABLET ORAL DAILY
Qty: 90 TABLET | Refills: 1 | Status: SHIPPED | OUTPATIENT
Start: 2021-11-10

## 2021-11-10 RX ORDER — MULTIVIT WITH CALCIUM,IRON,MIN 18MG-0.4MG
TABLET ORAL
COMMUNITY

## 2021-11-10 RX ORDER — MOMETASONE FUROATE 110 UG/1
2 INHALANT RESPIRATORY (INHALATION) 2 TIMES DAILY
Qty: 3 EACH | Refills: 1 | Status: SHIPPED | OUTPATIENT
Start: 2021-11-10 | End: 2021-11-13

## 2021-11-10 RX ORDER — BIOTIN 1 MG
1000 TABLET ORAL 3 TIMES DAILY
COMMUNITY

## 2021-11-10 RX ORDER — ALBUTEROL SULFATE 90 UG/1
2 AEROSOL, METERED RESPIRATORY (INHALATION) EVERY 4 HOURS PRN
Qty: 3 EACH | Refills: 1 | Status: SHIPPED | OUTPATIENT
Start: 2021-11-10 | End: 2022-02-01

## 2021-11-10 NOTE — PROGRESS NOTES
Subjective:   Patient ID: Lindsey Connelly is a 42year old female. Here for yearly physical. Doing well overall   Asthma controlled       History/Other:   Review of Systems     Constitutional: Negative.   Negative for activity change, appetite change, d person, place, and time. Deep Tendon Reflexes: Reflexes are normal and symmetric.          Assessment & Plan:   Annual physical exam  (primary encounter diagnosis)  cpm     Orders Placed This Encounter      Comp Metabolic Panel (14)      Lipid Panel

## 2021-11-11 ENCOUNTER — TELEPHONE (OUTPATIENT)
Dept: FAMILY MEDICINE CLINIC | Facility: CLINIC | Age: 41
End: 2021-11-11

## 2021-11-11 NOTE — TELEPHONE ENCOUNTER
Cody Ramos from [address] needs directions/ clearification on the following medication. Would like to know if Dr. Yaw Baez wants airfill or powder.     Mometasone Furoate (ASMANEX, 30 METERED

## 2021-11-13 RX ORDER — MOMETASONE FUROATE 110 UG/1
2 INHALANT RESPIRATORY (INHALATION) 2 TIMES DAILY
Qty: 3 EACH | Refills: 1 | Status: SHIPPED | OUTPATIENT
Start: 2021-11-13

## 2021-11-24 ENCOUNTER — TELEPHONE (OUTPATIENT)
Dept: FAMILY MEDICINE CLINIC | Facility: CLINIC | Age: 41
End: 2021-11-24

## 2021-11-24 RX ORDER — MOMETASONE FUROATE 110 UG/1
2 INHALANT RESPIRATORY (INHALATION) 2 TIMES DAILY
Qty: 3 EACH | Refills: 1 | Status: CANCELLED | OUTPATIENT
Start: 2021-11-24

## 2021-11-24 NOTE — TELEPHONE ENCOUNTER
Pharmacist indicated that the asmanex 110mcg does not have enough doses for 2 puffs 2 times daily. asmanex 110mcg is only for directions of 1 puff once daily. The asmanex 100mcg HFA can do 2 puffs 2 times daily. Rx pended for asmanex 100mcg HFA.  Please adv

## 2021-11-26 RX ORDER — DEXAMETHASONE 4 MG/1
2 TABLET ORAL 2 TIMES DAILY
Qty: 12 G | Refills: 1 | Status: SHIPPED | OUTPATIENT
Start: 2021-11-26

## 2021-11-26 NOTE — TELEPHONE ENCOUNTER
Dr. Saenz Nivia - Routed to you as patient has not been able to get this medication yet, due to other issues, but now because of back-order. Thank you for your help. Rx pended. Please double check refill # thank you.    Please reply to pool: EM TRIAGE SUPPORT

## 2021-11-26 NOTE — TELEPHONE ENCOUNTER
Rx was refilled on 11/24     Disp Refills Start End    Mometasone Furoate 100 MCG/ACT Inhalation Aerosol 3 each 1 11/24/2021     Sig - Route: Inhale 2 puffs into the lungs 2 (two) times a day.  - Inhalation    Sent to pharmacy as: Mometasone Furoate 100 M

## 2022-02-01 ENCOUNTER — TELEPHONE (OUTPATIENT)
Dept: FAMILY MEDICINE CLINIC | Facility: CLINIC | Age: 42
End: 2022-02-01

## 2022-02-01 RX ORDER — ALBUTEROL SULFATE 90 UG/1
2 AEROSOL, METERED RESPIRATORY (INHALATION) EVERY 4 HOURS PRN
Qty: 3 EACH | Refills: 1 | Status: SHIPPED | OUTPATIENT
Start: 2022-02-01

## 2022-02-01 RX ORDER — ALBUTEROL SULFATE 90 UG/1
2 AEROSOL, METERED RESPIRATORY (INHALATION) EVERY 4 HOURS PRN
Qty: 3 EACH | Refills: 1 | Status: SHIPPED | OUTPATIENT
Start: 2022-02-01 | End: 2022-02-01

## 2022-03-21 ENCOUNTER — HOSPITAL ENCOUNTER (OUTPATIENT)
Dept: MAMMOGRAPHY | Age: 42
Discharge: HOME | End: 2022-03-21
Attending: FAMILY MEDICINE
Payer: COMMERCIAL

## 2022-03-21 DIAGNOSIS — Z00.00 ANNUAL PHYSICAL EXAM: ICD-10-CM

## 2022-03-21 PROCEDURE — 77063 BREAST TOMOSYNTHESIS BI: CPT | Performed by: FAMILY MEDICINE

## 2022-03-21 PROCEDURE — 77067 SCR MAMMO BI INCL CAD: CPT | Performed by: FAMILY MEDICINE

## 2022-08-08 ENCOUNTER — OFFICE VISIT (OUTPATIENT)
Dept: FAMILY MEDICINE CLINIC | Facility: CLINIC | Age: 42
End: 2022-08-08
Payer: COMMERCIAL

## 2022-08-08 VITALS
RESPIRATION RATE: 18 BRPM | BODY MASS INDEX: 26.89 KG/M2 | TEMPERATURE: 97 F | HEART RATE: 91 BPM | HEIGHT: 59 IN | SYSTOLIC BLOOD PRESSURE: 103 MMHG | OXYGEN SATURATION: 100 % | DIASTOLIC BLOOD PRESSURE: 71 MMHG | WEIGHT: 133.38 LBS

## 2022-08-08 DIAGNOSIS — H10.9 CONJUNCTIVITIS OF BOTH EYES, UNSPECIFIED CONJUNCTIVITIS TYPE: ICD-10-CM

## 2022-08-08 DIAGNOSIS — J06.9 URI WITH COUGH AND CONGESTION: Primary | ICD-10-CM

## 2022-08-08 LAB — SARS-COV-2 RNA RESP QL NAA+PROBE: NOT DETECTED

## 2022-08-08 PROCEDURE — 3074F SYST BP LT 130 MM HG: CPT | Performed by: NURSE PRACTITIONER

## 2022-08-08 PROCEDURE — 99213 OFFICE O/P EST LOW 20 MIN: CPT | Performed by: NURSE PRACTITIONER

## 2022-08-08 PROCEDURE — 3008F BODY MASS INDEX DOCD: CPT | Performed by: NURSE PRACTITIONER

## 2022-08-08 PROCEDURE — 3078F DIAST BP <80 MM HG: CPT | Performed by: NURSE PRACTITIONER

## 2022-08-08 RX ORDER — OFLOXACIN 3 MG/ML
1 SOLUTION/ DROPS OPHTHALMIC 4 TIMES DAILY
Qty: 1 EACH | Refills: 0 | Status: SHIPPED | OUTPATIENT
Start: 2022-08-08

## 2022-09-04 NOTE — TELEPHONE ENCOUNTER
Please advise. Drug warning of multiple forms.     Requested Prescriptions   Pending Prescriptions Disp Refills    FLOVENT  MCG/ACT Inhalation Aerosol [Pharmacy Med Name: [address] [place] [address]  MCG INHALER] 12 each 0     Sig: TAKE 2 PUFFS BY MOUTH TWICE A DAY        Asthma & COPD Medication Protocol Passed - 9/4/2022 10:45 AM        Passed - In person appointment or virtual visit in the past 6 mos or appointment in next 3 mos       Recent Outpatient Visits              3 weeks ago URI with cough and congestion    [address], APRN    Office Visit    9 months ago Annual physical exam    [facility], [address] MD    Office Visit    11 months ago Right forearm pain    Lum MD Rogelio    Office Visit    1 year ago Viral syndrome    [facility], [address] [place], MD    Telemedicine    1 year ago Acute cystitis with hematuria    [facility] FRANKIE Mendez    Office Visit     Future Appointments         Provider Department Appt Notes    In 1 month Lisa Segal MD [facility], [address] [employer] [facility]                     Recent Outpatient Visits              3 weeks ago URI with cough and congestion    [facility], APRN    Office Visit    9 months ago Annual physical exam    [facility], [address] MD    Office Visit    11 months ago Right forearm pain    Lum MD Rogelio    Office Visit    1 year ago Viral syndrome    [facility], [address] [place], MD    Telemedicine    1 year ago Acute cystitis with hematuria    [facility] FRANKIE Contreras    Office Visit            Future Appointments         Provider Department Appt Notes    In 1 month [facility] Segal MD [address] [facility] [facility]

## 2022-09-05 RX ORDER — FLUTICASONE PROPIONATE 110 UG/1
2 AEROSOL, METERED RESPIRATORY (INHALATION) 2 TIMES DAILY
Qty: 12 EACH | Refills: 1 | Status: SHIPPED | OUTPATIENT
Start: 2022-09-05

## 2022-10-11 ENCOUNTER — OFFICE VISIT (OUTPATIENT)
Dept: FAMILY MEDICINE CLINIC | Facility: CLINIC | Age: 42
End: 2022-10-11
Payer: COMMERCIAL

## 2022-10-11 VITALS
HEIGHT: 59 IN | WEIGHT: 133 LBS | HEART RATE: 68 BPM | BODY MASS INDEX: 26.81 KG/M2 | SYSTOLIC BLOOD PRESSURE: 104 MMHG | DIASTOLIC BLOOD PRESSURE: 69 MMHG | TEMPERATURE: 97 F

## 2022-10-11 DIAGNOSIS — Z00.00 ANNUAL PHYSICAL EXAM: Primary | ICD-10-CM

## 2022-10-11 PROCEDURE — 3078F DIAST BP <80 MM HG: CPT | Performed by: FAMILY MEDICINE

## 2022-10-11 PROCEDURE — 90471 IMMUNIZATION ADMIN: CPT | Performed by: FAMILY MEDICINE

## 2022-10-11 PROCEDURE — 90686 IIV4 VACC NO PRSV 0.5 ML IM: CPT | Performed by: FAMILY MEDICINE

## 2022-10-11 PROCEDURE — 3074F SYST BP LT 130 MM HG: CPT | Performed by: FAMILY MEDICINE

## 2022-10-11 PROCEDURE — 99396 PREV VISIT EST AGE 40-64: CPT | Performed by: FAMILY MEDICINE

## 2022-10-11 PROCEDURE — 3008F BODY MASS INDEX DOCD: CPT | Performed by: FAMILY MEDICINE

## 2022-10-12 LAB
C TRACH DNA SPEC QL NAA+PROBE: NEGATIVE
HPV I/H RISK 1 DNA SPEC QL NAA+PROBE: NEGATIVE
N GONORRHOEA DNA SPEC QL NAA+PROBE: NEGATIVE

## 2022-11-01 ENCOUNTER — TELEPHONE (OUTPATIENT)
Dept: FAMILY MEDICINE CLINIC | Facility: CLINIC | Age: 42
End: 2022-11-01

## 2022-11-07 ENCOUNTER — LAB ENCOUNTER (OUTPATIENT)
Dept: LAB | Age: 42
End: 2022-11-07
Attending: FAMILY MEDICINE
Payer: COMMERCIAL

## 2022-11-07 DIAGNOSIS — Z00.00 ANNUAL PHYSICAL EXAM: ICD-10-CM

## 2022-11-07 LAB
ALBUMIN SERPL-MCNC: 3.4 G/DL (ref 3.4–5)
ALBUMIN/GLOB SERPL: 0.9 {RATIO} (ref 1–2)
ALP LIVER SERPL-CCNC: 55 U/L
ALT SERPL-CCNC: 18 U/L
ANION GAP SERPL CALC-SCNC: 5 MMOL/L (ref 0–18)
AST SERPL-CCNC: 14 U/L (ref 15–37)
BASOPHILS # BLD AUTO: 0.04 X10(3) UL (ref 0–0.2)
BASOPHILS NFR BLD AUTO: 0.7 %
BILIRUB SERPL-MCNC: 0.3 MG/DL (ref 0.1–2)
BUN BLD-MCNC: 11 MG/DL (ref 7–18)
BUN/CREAT SERPL: 13.4 (ref 10–20)
CALCIUM BLD-MCNC: 9 MG/DL (ref 8.5–10.1)
CHLORIDE SERPL-SCNC: 105 MMOL/L (ref 98–112)
CHOLEST SERPL-MCNC: 231 MG/DL (ref ?–200)
CO2 SERPL-SCNC: 28 MMOL/L (ref 21–32)
CREAT BLD-MCNC: 0.82 MG/DL
DEPRECATED RDW RBC AUTO: 43.5 FL (ref 35.1–46.3)
EOSINOPHIL # BLD AUTO: 0.27 X10(3) UL (ref 0–0.7)
EOSINOPHIL NFR BLD AUTO: 4.8 %
ERYTHROCYTE [DISTWIDTH] IN BLOOD BY AUTOMATED COUNT: 14.1 % (ref 11–15)
FASTING PATIENT LIPID ANSWER: YES
FASTING STATUS PATIENT QL REPORTED: YES
GFR SERPLBLD BASED ON 1.73 SQ M-ARVRAT: 91 ML/MIN/1.73M2 (ref 60–?)
GLOBULIN PLAS-MCNC: 3.8 G/DL (ref 2.8–4.4)
GLUCOSE BLD-MCNC: 97 MG/DL (ref 70–99)
HCT VFR BLD AUTO: 40.8 %
HDLC SERPL-MCNC: 44 MG/DL (ref 40–59)
HGB BLD-MCNC: 12.3 G/DL
IMM GRANULOCYTES # BLD AUTO: 0.01 X10(3) UL (ref 0–1)
IMM GRANULOCYTES NFR BLD: 0.2 %
LDLC SERPL CALC-MCNC: 170 MG/DL (ref ?–100)
LYMPHOCYTES # BLD AUTO: 2.13 X10(3) UL (ref 1–4)
LYMPHOCYTES NFR BLD AUTO: 38.2 %
MCH RBC QN AUTO: 25.3 PG (ref 26–34)
MCHC RBC AUTO-ENTMCNC: 30.1 G/DL (ref 31–37)
MCV RBC AUTO: 84 FL
MONOCYTES # BLD AUTO: 0.49 X10(3) UL (ref 0.1–1)
MONOCYTES NFR BLD AUTO: 8.8 %
NEUTROPHILS # BLD AUTO: 2.64 X10 (3) UL (ref 1.5–7.7)
NEUTROPHILS # BLD AUTO: 2.64 X10(3) UL (ref 1.5–7.7)
NEUTROPHILS NFR BLD AUTO: 47.3 %
NONHDLC SERPL-MCNC: 187 MG/DL (ref ?–130)
OSMOLALITY SERPL CALC.SUM OF ELEC: 285 MOSM/KG (ref 275–295)
PLATELET # BLD AUTO: 272 10(3)UL (ref 150–450)
POTASSIUM SERPL-SCNC: 4 MMOL/L (ref 3.5–5.1)
PROT SERPL-MCNC: 7.2 G/DL (ref 6.4–8.2)
RBC # BLD AUTO: 4.86 X10(6)UL
SODIUM SERPL-SCNC: 138 MMOL/L (ref 136–145)
TRIGL SERPL-MCNC: 94 MG/DL (ref 30–149)
TSI SER-ACNC: 1.81 MIU/ML (ref 0.36–3.74)
VLDLC SERPL CALC-MCNC: 19 MG/DL (ref 0–30)
WBC # BLD AUTO: 5.6 X10(3) UL (ref 4–11)

## 2022-11-07 PROCEDURE — 85025 COMPLETE CBC W/AUTO DIFF WBC: CPT

## 2022-11-07 PROCEDURE — 80053 COMPREHEN METABOLIC PANEL: CPT

## 2022-11-07 PROCEDURE — 84443 ASSAY THYROID STIM HORMONE: CPT

## 2022-11-07 PROCEDURE — 36415 COLL VENOUS BLD VENIPUNCTURE: CPT

## 2022-11-07 PROCEDURE — 80061 LIPID PANEL: CPT

## 2022-11-11 NOTE — TELEPHONE ENCOUNTER
Patient calling to request update on form, needs to be faxed by Monday for benefits. Requesting call back to verify.

## 2023-05-30 ENCOUNTER — OFFICE VISIT (OUTPATIENT)
Dept: FAMILY MEDICINE CLINIC | Facility: CLINIC | Age: 43
End: 2023-05-30

## 2023-05-30 VITALS
BODY MASS INDEX: 27.62 KG/M2 | OXYGEN SATURATION: 98 % | DIASTOLIC BLOOD PRESSURE: 68 MMHG | SYSTOLIC BLOOD PRESSURE: 103 MMHG | WEIGHT: 137 LBS | RESPIRATION RATE: 16 BRPM | HEART RATE: 71 BPM | HEIGHT: 59 IN

## 2023-05-30 DIAGNOSIS — L30.9 DERMATITIS: Primary | ICD-10-CM

## 2023-05-30 PROCEDURE — 3078F DIAST BP <80 MM HG: CPT | Performed by: FAMILY MEDICINE

## 2023-05-30 PROCEDURE — 3074F SYST BP LT 130 MM HG: CPT | Performed by: FAMILY MEDICINE

## 2023-05-30 PROCEDURE — 99213 OFFICE O/P EST LOW 20 MIN: CPT | Performed by: FAMILY MEDICINE

## 2023-05-30 PROCEDURE — 3008F BODY MASS INDEX DOCD: CPT | Performed by: FAMILY MEDICINE

## 2023-05-30 RX ORDER — TRIAMCINOLONE ACETONIDE 1 MG/G
CREAM TOPICAL 2 TIMES DAILY PRN
Qty: 60 G | Refills: 0 | Status: SHIPPED | OUTPATIENT
Start: 2023-05-30

## 2023-05-30 RX ORDER — METHYLPREDNISOLONE 4 MG/1
TABLET ORAL
Qty: 1 EACH | Refills: 0 | Status: SHIPPED | OUTPATIENT
Start: 2023-05-30

## 2023-10-28 ENCOUNTER — LAB ENCOUNTER (OUTPATIENT)
Dept: LAB | Age: 43
End: 2023-10-28
Attending: FAMILY MEDICINE
Payer: COMMERCIAL

## 2023-10-28 ENCOUNTER — OFFICE VISIT (OUTPATIENT)
Dept: FAMILY MEDICINE CLINIC | Facility: CLINIC | Age: 43
End: 2023-10-28

## 2023-10-28 VITALS
BODY MASS INDEX: 27.05 KG/M2 | WEIGHT: 134.19 LBS | OXYGEN SATURATION: 99 % | HEIGHT: 59 IN | SYSTOLIC BLOOD PRESSURE: 91 MMHG | DIASTOLIC BLOOD PRESSURE: 57 MMHG | RESPIRATION RATE: 18 BRPM | HEART RATE: 84 BPM

## 2023-10-28 DIAGNOSIS — Z00.00 ANNUAL PHYSICAL EXAM: ICD-10-CM

## 2023-10-28 DIAGNOSIS — Z12.31 SCREENING MAMMOGRAM FOR BREAST CANCER: Primary | ICD-10-CM

## 2023-10-28 LAB
ALBUMIN SERPL-MCNC: 3.5 G/DL (ref 3.4–5)
ALBUMIN/GLOB SERPL: 0.9 {RATIO} (ref 1–2)
ALP LIVER SERPL-CCNC: 57 U/L
ALT SERPL-CCNC: 24 U/L
ANION GAP SERPL CALC-SCNC: 7 MMOL/L (ref 0–18)
AST SERPL-CCNC: 15 U/L (ref 15–37)
BASOPHILS # BLD AUTO: 0.04 X10(3) UL (ref 0–0.2)
BASOPHILS NFR BLD AUTO: 0.7 %
BILIRUB SERPL-MCNC: 0.4 MG/DL (ref 0.1–2)
BUN BLD-MCNC: 13 MG/DL (ref 7–18)
BUN/CREAT SERPL: 15.1 (ref 10–20)
CALCIUM BLD-MCNC: 8.8 MG/DL (ref 8.5–10.1)
CHLORIDE SERPL-SCNC: 108 MMOL/L (ref 98–112)
CHOLEST SERPL-MCNC: 254 MG/DL (ref ?–200)
CO2 SERPL-SCNC: 25 MMOL/L (ref 21–32)
CREAT BLD-MCNC: 0.86 MG/DL
DEPRECATED RDW RBC AUTO: 40.4 FL (ref 35.1–46.3)
EGFRCR SERPLBLD CKD-EPI 2021: 85 ML/MIN/1.73M2 (ref 60–?)
EOSINOPHIL # BLD AUTO: 0.35 X10(3) UL (ref 0–0.7)
EOSINOPHIL NFR BLD AUTO: 5.9 %
ERYTHROCYTE [DISTWIDTH] IN BLOOD BY AUTOMATED COUNT: 13.8 % (ref 11–15)
FASTING PATIENT LIPID ANSWER: YES
FASTING STATUS PATIENT QL REPORTED: YES
GLOBULIN PLAS-MCNC: 3.7 G/DL (ref 2.8–4.4)
GLUCOSE BLD-MCNC: 95 MG/DL (ref 70–99)
HCT VFR BLD AUTO: 39.6 %
HDLC SERPL-MCNC: 45 MG/DL (ref 40–59)
HGB BLD-MCNC: 12.3 G/DL
IMM GRANULOCYTES # BLD AUTO: 0 X10(3) UL (ref 0–1)
IMM GRANULOCYTES NFR BLD: 0 %
LDLC SERPL CALC-MCNC: 193 MG/DL (ref ?–100)
LYMPHOCYTES # BLD AUTO: 2.21 X10(3) UL (ref 1–4)
LYMPHOCYTES NFR BLD AUTO: 37.3 %
MCH RBC QN AUTO: 25.4 PG (ref 26–34)
MCHC RBC AUTO-ENTMCNC: 31.1 G/DL (ref 31–37)
MCV RBC AUTO: 81.6 FL
MONOCYTES # BLD AUTO: 0.48 X10(3) UL (ref 0.1–1)
MONOCYTES NFR BLD AUTO: 8.1 %
NEUTROPHILS # BLD AUTO: 2.84 X10 (3) UL (ref 1.5–7.7)
NEUTROPHILS # BLD AUTO: 2.84 X10(3) UL (ref 1.5–7.7)
NEUTROPHILS NFR BLD AUTO: 48 %
NONHDLC SERPL-MCNC: 209 MG/DL (ref ?–130)
OSMOLALITY SERPL CALC.SUM OF ELEC: 290 MOSM/KG (ref 275–295)
PLATELET # BLD AUTO: 289 10(3)UL (ref 150–450)
POTASSIUM SERPL-SCNC: 4 MMOL/L (ref 3.5–5.1)
PROT SERPL-MCNC: 7.2 G/DL (ref 6.4–8.2)
RBC # BLD AUTO: 4.85 X10(6)UL
SODIUM SERPL-SCNC: 140 MMOL/L (ref 136–145)
TRIGL SERPL-MCNC: 93 MG/DL (ref 30–149)
TSI SER-ACNC: 1.3 MIU/ML (ref 0.36–3.74)
VLDLC SERPL CALC-MCNC: 19 MG/DL (ref 0–30)
WBC # BLD AUTO: 5.9 X10(3) UL (ref 4–11)

## 2023-10-28 PROCEDURE — 84443 ASSAY THYROID STIM HORMONE: CPT

## 2023-10-28 PROCEDURE — 80061 LIPID PANEL: CPT

## 2023-10-28 PROCEDURE — 85025 COMPLETE CBC W/AUTO DIFF WBC: CPT

## 2023-10-28 PROCEDURE — 80053 COMPREHEN METABOLIC PANEL: CPT

## 2023-10-28 PROCEDURE — 36415 COLL VENOUS BLD VENIPUNCTURE: CPT

## 2023-10-28 RX ORDER — FLUTICASONE PROPIONATE 110 UG/1
2 AEROSOL, METERED RESPIRATORY (INHALATION) 2 TIMES DAILY
Qty: 12 EACH | Refills: 1 | Status: SHIPPED | OUTPATIENT
Start: 2023-10-28

## 2023-10-28 NOTE — PROGRESS NOTES
Subjective:   Patient ID: Olga Pearson is a 39year old female. HPI  Here for annual physical   Doing well   Asthma controlled   Using albuterol less then twice a week   History/Other:   Review of Systems    Constitutional: Negative. Negative for activity change, appetite change, diaphoresis and fatigue. Respiratory: Negative. Negative for apnea, cough, chest tightness and shortness of breath. Cardiovascular: Negative. Negative for chest pain, palpitations and leg swelling. Gastrointestinal: Negative. Negative for abdominal pain. Skin: Negative. Psychiatric/Behavioral: Negative. Current Outpatient Medications   Medication Sig Dispense Refill    fluticasone propionate (FLOVENT HFA) 110 MCG/ACT Inhalation Aerosol Inhale 2 puffs into the lungs 2 (two) times daily. 12 each 1    triamcinolone 0.1 % External Cream Apply topically 2 (two) times daily as needed. 60 g 0    albuterol (PROAIR HFA) 108 (90 Base) MCG/ACT Inhalation Aero Soln Inhale 2 puffs into the lungs every 4 (four) hours as needed for Wheezing. 3 each 1    Biotin 1000 MCG Oral Tab Take 1,000 mcg by mouth 3 (three) times daily. Multiple Vitamins-Minerals (QC WOMENS DAILY MULTIVITAMIN) Oral Tab Take by mouth. Allergies:  Shrimp                  SWELLING  Sulfa Antibiotics       ITCHING    Objective:   Physical Exam  Constitutional:       Appearance: She is well-developed. Cardiovascular:      Rate and Rhythm: Normal rate and regular rhythm. Heart sounds: Normal heart sounds. Pulmonary:      Effort: Pulmonary effort is normal.      Breath sounds: Normal breath sounds. Abdominal:      General: Bowel sounds are normal.      Palpations: Abdomen is soft. Skin:     General: Skin is warm and dry. Neurological:      Mental Status: She is alert. Deep Tendon Reflexes: Reflexes are normal and symmetric.          Assessment & Plan:   Screening mammogram for breast cancer  (primary encounter diagnosis)  Annual physical exam  Diet and exercise discussed  Orders Placed This Encounter      Comp Metabolic Panel (14)      Lipid Panel      Assay, Thyroid Stim Hormone      CBC With Differential With Platelet      Fluzone Quadrivalent 6mo+ 0.5mL      Meds This Visit:  Requested Prescriptions     Signed Prescriptions Disp Refills    fluticasone propionate (FLOVENT HFA) 110 MCG/ACT Inhalation Aerosol 12 each 1     Sig: Inhale 2 puffs into the lungs 2 (two) times daily.        Imaging & Referrals:  INFLUENZA VACCINE, QUAD, PRESERVATIVE FREE, 0.5 ML  [facility] SIMONA 2D+3D SCREENING BILAT (CPT=77067/46157)

## 2023-10-30 ENCOUNTER — PATIENT MESSAGE (OUTPATIENT)
Dept: FAMILY MEDICINE CLINIC | Facility: CLINIC | Age: 43
End: 2023-10-30

## 2023-10-31 ENCOUNTER — TELEPHONE (OUTPATIENT)
Dept: FAMILY MEDICINE CLINIC | Facility: CLINIC | Age: 43
End: 2023-10-31

## 2023-10-31 NOTE — TELEPHONE ENCOUNTER
Pharmacy: change to alternative      fluticasone propionate (FLOVENT HFA) 110 MCG/ACT Inhalation Aerosol, Inhale 2 puffs into the lungs 2 (two) times daily. , Disp: 12 each, Rfl: 1

## 2023-11-02 NOTE — TELEPHONE ENCOUNTER
From: Rene Lechuga  To: Manpreet Ryan  Sent: 10/30/2023 1:01 PM CDT  Subject: biometric form    please complete and sign then fax the biometric form prior to 11/14.  thank you

## 2023-11-08 NOTE — TELEPHONE ENCOUNTER
Patient calling to check on status of biometric form, wants to make sure that it is completed and faxed early enough so there are no issues with the cut-off time of 11/14    Please contact patient to let her know when form has been completed and faxed to the number on the form    Contact patient via phone call if there is anything needed from patient for provider to be able to complete the form.

## 2023-11-10 ENCOUNTER — HOSPITAL ENCOUNTER (OUTPATIENT)
Dept: MAMMOGRAPHY | Age: 43
Discharge: HOME | End: 2023-11-10
Attending: FAMILY MEDICINE
Payer: COMMERCIAL

## 2023-11-10 ENCOUNTER — TELEPHONE (OUTPATIENT)
Dept: FAMILY MEDICINE CLINIC | Facility: CLINIC | Age: 43
End: 2023-11-10

## 2023-11-10 DIAGNOSIS — Z12.31 SCREENING MAMMOGRAM FOR BREAST CANCER: ICD-10-CM

## 2023-11-10 PROCEDURE — 77067 SCR MAMMO BI INCL CAD: CPT | Performed by: FAMILY MEDICINE

## 2023-11-10 PROCEDURE — 77063 BREAST TOMOSYNTHESIS BI: CPT | Performed by: FAMILY MEDICINE

## 2023-11-10 NOTE — TELEPHONE ENCOUNTER
Pt is calling for status of the form she sent through her [employer]. Pt states that this is very important and needs this completed and faxed ASAP. Please, also see previous encounter of 10-30-23. Pt is requesting a all back today with status.

## 2023-11-13 NOTE — TELEPHONE ENCOUNTER
Updated form with patient's signature reprinted for provider to review and sign. Paperwork placed in provider's red folder in office. Patient needs completed forms to be faxed 11/14/23 and would like call back after completion.

## 2023-11-13 NOTE — TELEPHONE ENCOUNTER
Patient called upset being slightly rude because she has not heard anything back about the completion of her form as it needs to be completed before tomorrow 11/14/2023. She is asking that this form is completed and faxed to the fax# on the form ASAP. And she would like a callback in regards to this.

## 2023-11-14 ENCOUNTER — MED REC SCAN ONLY (OUTPATIENT)
Dept: FAMILY MEDICINE CLINIC | Facility: CLINIC | Age: 43
End: 2023-11-14

## 2023-12-07 ENCOUNTER — TELEPHONE (OUTPATIENT)
Dept: FAMILY MEDICINE CLINIC | Facility: CLINIC | Age: 43
End: 2023-12-07

## 2023-12-07 RX ORDER — ROSUVASTATIN CALCIUM 10 MG/1
10 TABLET, COATED ORAL NIGHTLY
Qty: 90 TABLET | Refills: 1 | Status: SHIPPED | OUTPATIENT
Start: 2023-12-07 | End: 2024-06-04

## 2023-12-15 RX ORDER — MOMETASONE FUROATE 100 UG/1
2 AEROSOL RESPIRATORY (INHALATION) DAILY
Qty: 13 G | Refills: 3 | Status: SHIPPED | OUTPATIENT
Start: 2023-12-15

## 2023-12-15 NOTE — TELEPHONE ENCOUNTER
Spoke to patient, verified Name and . States that she still takes this medication but she ran out. She recalls having this refilled from the pharmacy before but was told that they did not have this in stock so she did not bother to get it refilled. She does have the rescue inhaler but needs to have something that she needs to take daily as instructed by her PCP.

## 2023-12-15 NOTE — TELEPHONE ENCOUNTER
Refill passed per [place] protocol but refilled as it is not currently in her medication list.  Please verify with pt that she is actually taking this. My chart message sent. Requested Prescriptions   Pending Prescriptions Disp Refills    Mometasone Furoate (ASMANEX HFA) 100 MCG/ACT Inhalation Aerosol 13 g 0     Sig: Inhale 2 puffs into the lungs daily.        Asthma & COPD Medication Protocol Passed - 12/14/2023 10:06 AM        Passed - In person appointment or virtual visit in the past 6 mos or appointment in next 3 mos     Recent Outpatient Visits              1 month ago Screening mammogram for breast cancer    [address] [place] [address], Zeny Philippe MD    Office Visit    6 months ago Dermatitis    Sulema Chow MD    Office Visit    1 year ago Annual physical exam    Sulema Chow MD    Office Visit    1 year ago URI with cough and congestion    [facility], [facility]-In Clinic, [address] [place] [address],[address], [facility], [employer] Visit    2 years ago Annual physical exam    Sulema Chow MD    Office Visit                           [unfilled]      @[email or web address]@

## 2023-12-15 NOTE — TELEPHONE ENCOUNTER
Refill passed per [place] [facility] [place] [employer] protocol. Requested Prescriptions   Pending Prescriptions Disp Refills    Mometasone Furoate (ASMANEX HFA) 100 MCG/ACT Inhalation Aerosol 13 g 0     Sig: Inhale 2 puffs into the lungs daily.        Asthma & COPD Medication Protocol Passed - 12/14/2023 10:06 AM        Passed - In person appointment or virtual visit in the past 6 mos or appointment in next 3 mos     Recent Outpatient Visits              1 month ago Screening mammogram for breast cancer    Meagan Leon MD    Office Visit    6 months ago Dermatitis    Julian Paniagua MD    Office Visit    1 year ago Annual physical exam    Julian Paniagua MD    Office Visit    1 year ago URI with cough and congestion    [facility], Walk-In Clinic, [address], [facility] Wally Johns, [employer] Visit    2 years ago Annual physical exam    Julian Paniagua MD    Office Visit                           Recent Outpatient Visits              1 month ago Screening mammogram for breast cancer    Meagan Leon MD    Office Visit    6 months ago Dermatitis    Julian Paniagua MD    Office Visit    1 year ago Annual physical exam    Julian Paniagua MD    Office Visit    1 year ago URI with cough and congestion    [facility], [facility]In [facility], [address], [facility] Wally Johns, [employer] Visit    2 years ago Annual physical exam    Julian Paniagua MD    Office Visit

## 2024-10-27 ENCOUNTER — HOSPITAL ENCOUNTER (EMERGENCY)
Facility: HOSPITAL | Age: 44
Discharge: HOME | End: 2024-10-27
Attending: EMERGENCY MEDICINE
Payer: COMMERCIAL

## 2024-10-27 ENCOUNTER — APPOINTMENT (OUTPATIENT)
Dept: GENERAL RADIOLOGY | Facility: HOSPITAL | Age: 44
End: 2024-10-27
Attending: EMERGENCY MEDICINE
Payer: COMMERCIAL

## 2024-10-27 VITALS
DIASTOLIC BLOOD PRESSURE: 92 MMHG | HEIGHT: 59 IN | HEART RATE: 75 BPM | WEIGHT: 138 LBS | OXYGEN SATURATION: 100 % | RESPIRATION RATE: 18 BRPM | TEMPERATURE: 98 F | BODY MASS INDEX: 27.82 KG/M2 | SYSTOLIC BLOOD PRESSURE: 121 MMHG

## 2024-10-27 DIAGNOSIS — R09.1 PLEURISY: Primary | ICD-10-CM

## 2024-10-27 LAB
ALBUMIN SERPL-MCNC: 4.5 G/DL (ref 3.2–4.8)
ALP LIVER SERPL-CCNC: 93 U/L
ALT SERPL-CCNC: 28 U/L
ANION GAP SERPL CALC-SCNC: 6 MMOL/L (ref 0–18)
AST SERPL-CCNC: 28 U/L (ref ?–34)
B-HCG UR QL: NEGATIVE
BASOPHILS # BLD AUTO: 0.05 X10(3) UL (ref 0–0.2)
BASOPHILS NFR BLD AUTO: 0.7 %
BILIRUB DIRECT SERPL-MCNC: 0.1 MG/DL (ref ?–0.3)
BILIRUB SERPL-MCNC: 0.6 MG/DL (ref 0.3–1.2)
BILIRUB UR QL: NEGATIVE
BUN BLD-MCNC: 10 MG/DL (ref 9–23)
BUN/CREAT SERPL: 12.8 (ref 10–20)
CALCIUM BLD-MCNC: 9.1 MG/DL (ref 8.7–10.4)
CHLORIDE SERPL-SCNC: 108 MMOL/L (ref 98–112)
CLARITY UR: CLEAR
CO2 SERPL-SCNC: 28 MMOL/L (ref 21–32)
COLOR UR: COLORLESS
CREAT BLD-MCNC: 0.78 MG/DL
D DIMER PPP FEU-MCNC: 0.34 UG/ML FEU (ref ?–0.5)
DEPRECATED RDW RBC AUTO: 44.8 FL (ref 35.1–46.3)
EGFRCR SERPLBLD CKD-EPI 2021: 95 ML/MIN/1.73M2 (ref 60–?)
EOSINOPHIL # BLD AUTO: 0.36 X10(3) UL (ref 0–0.7)
EOSINOPHIL NFR BLD AUTO: 4.7 %
ERYTHROCYTE [DISTWIDTH] IN BLOOD BY AUTOMATED COUNT: 15.2 % (ref 11–15)
GLUCOSE BLD-MCNC: 100 MG/DL (ref 70–99)
GLUCOSE UR-MCNC: NORMAL MG/DL
HCT VFR BLD AUTO: 36.8 %
HGB BLD-MCNC: 11.3 G/DL
HGB UR QL STRIP.AUTO: NEGATIVE
IMM GRANULOCYTES # BLD AUTO: 0.02 X10(3) UL (ref 0–1)
IMM GRANULOCYTES NFR BLD: 0.3 %
KETONES UR-MCNC: NEGATIVE MG/DL
LEUKOCYTE ESTERASE UR QL STRIP.AUTO: NEGATIVE
LIPASE SERPL-CCNC: 32 U/L (ref 12–53)
LYMPHOCYTES # BLD AUTO: 1.83 X10(3) UL (ref 1–4)
LYMPHOCYTES NFR BLD AUTO: 24 %
MCH RBC QN AUTO: 25.2 PG (ref 26–34)
MCHC RBC AUTO-ENTMCNC: 30.7 G/DL (ref 31–37)
MCV RBC AUTO: 82.1 FL
MONOCYTES # BLD AUTO: 0.75 X10(3) UL (ref 0.1–1)
MONOCYTES NFR BLD AUTO: 9.8 %
NEUTROPHILS # BLD AUTO: 4.63 X10 (3) UL (ref 1.5–7.7)
NEUTROPHILS # BLD AUTO: 4.63 X10(3) UL (ref 1.5–7.7)
NEUTROPHILS NFR BLD AUTO: 60.5 %
NITRITE UR QL STRIP.AUTO: NEGATIVE
OSMOLALITY SERPL CALC.SUM OF ELEC: 293 MOSM/KG (ref 275–295)
PH UR: 5.5 [PH] (ref 5–8)
PLATELET # BLD AUTO: 244 10(3)UL (ref 150–450)
POTASSIUM SERPL-SCNC: 4.3 MMOL/L (ref 3.5–5.1)
PROT SERPL-MCNC: 7.4 G/DL (ref 5.7–8.2)
PROT UR-MCNC: NEGATIVE MG/DL
RBC # BLD AUTO: 4.48 X10(6)UL
SARS-COV-2 RNA RESP QL NAA+PROBE: NOT DETECTED
SODIUM SERPL-SCNC: 142 MMOL/L (ref 136–145)
SP GR UR STRIP: 1.01 (ref 1–1.03)
UROBILINOGEN UR STRIP-ACNC: NORMAL
WBC # BLD AUTO: 7.6 X10(3) UL (ref 4–11)

## 2024-10-27 PROCEDURE — 96376 TX/PRO/DX INJ SAME DRUG ADON: CPT

## 2024-10-27 PROCEDURE — 83690 ASSAY OF LIPASE: CPT | Performed by: EMERGENCY MEDICINE

## 2024-10-27 PROCEDURE — 93005 ELECTROCARDIOGRAM TRACING: CPT

## 2024-10-27 PROCEDURE — 85379 FIBRIN DEGRADATION QUANT: CPT | Performed by: EMERGENCY MEDICINE

## 2024-10-27 PROCEDURE — 99284 EMERGENCY DEPT VISIT MOD MDM: CPT

## 2024-10-27 PROCEDURE — 80076 HEPATIC FUNCTION PANEL: CPT | Performed by: EMERGENCY MEDICINE

## 2024-10-27 PROCEDURE — 80048 BASIC METABOLIC PNL TOTAL CA: CPT | Performed by: EMERGENCY MEDICINE

## 2024-10-27 PROCEDURE — 99285 EMERGENCY DEPT VISIT HI MDM: CPT

## 2024-10-27 PROCEDURE — 81003 URINALYSIS AUTO W/O SCOPE: CPT | Performed by: EMERGENCY MEDICINE

## 2024-10-27 PROCEDURE — 85025 COMPLETE CBC W/AUTO DIFF WBC: CPT | Performed by: EMERGENCY MEDICINE

## 2024-10-27 PROCEDURE — 71045 X-RAY EXAM CHEST 1 VIEW: CPT | Performed by: EMERGENCY MEDICINE

## 2024-10-27 PROCEDURE — 96374 THER/PROPH/DIAG INJ IV PUSH: CPT

## 2024-10-27 PROCEDURE — 93010 ELECTROCARDIOGRAM REPORT: CPT

## 2024-10-27 PROCEDURE — 81025 URINE PREGNANCY TEST: CPT

## 2024-10-27 PROCEDURE — 96375 TX/PRO/DX INJ NEW DRUG ADDON: CPT

## 2024-10-27 RX ORDER — KETOROLAC TROMETHAMINE 15 MG/ML
15 INJECTION, SOLUTION INTRAMUSCULAR; INTRAVENOUS ONCE
Status: COMPLETED | OUTPATIENT
Start: 2024-10-27 | End: 2024-10-27

## 2024-10-27 RX ORDER — ROSUVASTATIN CALCIUM 10 MG/1
10 TABLET, COATED ORAL NIGHTLY
COMMUNITY

## 2024-10-27 RX ORDER — METHYLPREDNISOLONE SODIUM SUCCINATE 125 MG/2ML
125 INJECTION, POWDER, LYOPHILIZED, FOR SOLUTION INTRAMUSCULAR; INTRAVENOUS ONCE
Status: COMPLETED | OUTPATIENT
Start: 2024-10-27 | End: 2024-10-27

## 2024-10-27 RX ORDER — PREDNISONE 20 MG/1
60 TABLET ORAL DAILY
Qty: 15 TABLET | Refills: 0 | Status: SHIPPED | OUTPATIENT
Start: 2024-10-27 | End: 2024-11-01

## 2024-10-27 RX ORDER — KETOROLAC TROMETHAMINE 10 MG/1
10 TABLET, FILM COATED ORAL EVERY 6 HOURS PRN
Qty: 10 TABLET | Refills: 0 | Status: SHIPPED | OUTPATIENT
Start: 2024-10-27 | End: 2024-11-02 | Stop reason: SINTOL
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
## 2024-10-27 NOTE — ED INITIAL ASSESSMENT (HPI)
Pain to R side, worse with lying down    Chest pain with deep breath    All symptoms onset 2 days ago, worse last night    Denies recent cough or fevers    Last took motrin 0900 today

## 2024-10-27 NOTE — DISCHARGE INSTRUCTIONS
Your symptoms currently are consistent with pleurisy or inflammation of the lungs.  Steroids will be prescribed to help reduce the inflammation.  This is a working diagnosis meaning that there is no test that can verify that this is pleurisy so should any symptoms change or worsen please return to the emergency department or follow-up with your PCP for further evaluation.
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
## 2024-10-28 LAB
ATRIAL RATE: 82 BPM
P AXIS: 62 DEGREES
P-R INTERVAL: 114 MS
Q-T INTERVAL: 360 MS
QRS DURATION: 74 MS
QTC CALCULATION (BEZET): 420 MS
R AXIS: 71 DEGREES
T AXIS: 59 DEGREES
VENTRICULAR RATE: 82 BPM

## 2024-11-02 ENCOUNTER — OFFICE VISIT (OUTPATIENT)
Dept: FAMILY MEDICINE CLINIC | Facility: CLINIC | Age: 44
End: 2024-11-02
Payer: COMMERCIAL

## 2024-11-02 ENCOUNTER — TELEPHONE (OUTPATIENT)
Dept: FAMILY MEDICINE CLINIC | Facility: CLINIC | Age: 44
End: 2024-11-02

## 2024-11-02 VITALS
SYSTOLIC BLOOD PRESSURE: 111 MMHG | WEIGHT: 145 LBS | HEIGHT: 59 IN | OXYGEN SATURATION: 100 % | DIASTOLIC BLOOD PRESSURE: 69 MMHG | TEMPERATURE: 97 F | HEART RATE: 81 BPM | BODY MASS INDEX: 29.23 KG/M2

## 2024-11-02 DIAGNOSIS — J18.9 PNEUMONIA DUE TO INFECTIOUS ORGANISM, UNSPECIFIED LATERALITY, UNSPECIFIED PART OF LUNG: Primary | ICD-10-CM

## 2024-11-02 DIAGNOSIS — M54.6 ACUTE RIGHT-SIDED THORACIC BACK PAIN: ICD-10-CM

## 2024-11-02 RX ORDER — IBUPROFEN 800 MG/1
800 TABLET, FILM COATED ORAL EVERY 8 HOURS PRN
Qty: 15 TABLET | Refills: 0 | Status: SHIPPED | OUTPATIENT
Start: 2024-11-02

## 2024-11-02 NOTE — TELEPHONE ENCOUNTER
Called patient in regards to message below ( identified name and date of birth )     States she is  at the office now  with Zoë Cadet  Future Appointments   Date Time Provider Department Center   2024  2:50 PM Aura Chapman MD [facility]     Name:SATURDAY TRIAGE RN                      2024 8:13:57 AM  ProfileId:    [id number]                   [facility]  Department:[place] ANSWERING SERVICE  ======================================================================  Text Messages    Message # [phone]         2024 08:11a   [GLYNN]  To:  SATURDAY TRIAGE RN  From:  KARLIE Ford MD:  Phone#:  [phone]  ----------------------------------------------------------------------   1980  PT IS EXPERIANCING SYMPTOMS FROM PNEUMONIA

## 2024-11-07 ENCOUNTER — HOSPITAL ENCOUNTER (OUTPATIENT)
Dept: GENERAL RADIOLOGY | Age: 44
Discharge: HOME | End: 2024-11-07
Payer: COMMERCIAL

## 2024-11-07 DIAGNOSIS — J18.9 PNEUMONIA DUE TO INFECTIOUS ORGANISM, UNSPECIFIED LATERALITY, UNSPECIFIED PART OF LUNG: ICD-10-CM

## 2024-11-07 PROCEDURE — 71046 X-RAY EXAM CHEST 2 VIEWS: CPT

## 2024-11-08 ENCOUNTER — TELEPHONE (OUTPATIENT)
Dept: FAMILY MEDICINE CLINIC | Facility: CLINIC | Age: 44
End: 2024-11-08

## 2024-11-08 ENCOUNTER — LAB ENCOUNTER (OUTPATIENT)
Dept: LAB | Age: 44
End: 2024-11-08
Attending: FAMILY MEDICINE
Payer: COMMERCIAL

## 2024-11-08 ENCOUNTER — OFFICE VISIT (OUTPATIENT)
Dept: FAMILY MEDICINE CLINIC | Facility: CLINIC | Age: 44
End: 2024-11-08

## 2024-11-08 VITALS
TEMPERATURE: 98 F | HEIGHT: 59 IN | HEART RATE: 84 BPM | WEIGHT: 139 LBS | BODY MASS INDEX: 28.02 KG/M2 | DIASTOLIC BLOOD PRESSURE: 59 MMHG | OXYGEN SATURATION: 97 % | SYSTOLIC BLOOD PRESSURE: 89 MMHG | RESPIRATION RATE: 20 BRPM

## 2024-11-08 DIAGNOSIS — Z12.31 SCREENING MAMMOGRAM FOR BREAST CANCER: ICD-10-CM

## 2024-11-08 DIAGNOSIS — Z00.00 ANNUAL PHYSICAL EXAM: ICD-10-CM

## 2024-11-08 DIAGNOSIS — J45.30 MILD PERSISTENT ASTHMA WITHOUT COMPLICATION (HCC): ICD-10-CM

## 2024-11-08 DIAGNOSIS — Z12.11 SCREENING FOR COLON CANCER: Primary | ICD-10-CM

## 2024-11-08 LAB
CHOLEST SERPL-MCNC: 262 MG/DL (ref ?–200)
FASTING PATIENT LIPID ANSWER: YES
HDLC SERPL-MCNC: 44 MG/DL (ref 40–59)
LDLC SERPL CALC-MCNC: 198 MG/DL (ref ?–100)
NONHDLC SERPL-MCNC: 218 MG/DL (ref ?–130)
TRIGL SERPL-MCNC: 111 MG/DL (ref 30–149)
VLDLC SERPL CALC-MCNC: 24 MG/DL (ref 0–30)

## 2024-11-08 PROCEDURE — 80061 LIPID PANEL: CPT

## 2024-11-08 PROCEDURE — 36415 COLL VENOUS BLD VENIPUNCTURE: CPT

## 2024-11-08 RX ORDER — FLUTICASONE PROPIONATE 110 UG/1
2 AEROSOL, METERED RESPIRATORY (INHALATION) 2 TIMES DAILY
Qty: 12 EACH | Refills: 1 | Status: SHIPPED | OUTPATIENT
Start: 2024-11-08

## 2024-11-08 RX ORDER — FLUCONAZOLE 150 MG/1
150 TABLET ORAL ONCE
Qty: 1 TABLET | Refills: 0 | Status: SHIPPED | OUTPATIENT
Start: 2024-11-08 | End: 2024-11-08

## 2024-11-08 RX ORDER — ALBUTEROL SULFATE 90 UG/1
2 INHALANT RESPIRATORY (INHALATION) EVERY 4 HOURS PRN
Qty: 3 EACH | Refills: 1 | Status: SHIPPED | OUTPATIENT
Start: 2024-11-08

## 2024-11-08 NOTE — PROGRESS NOTES
Subjective:   Patient ID: Sumit Lechuga is a 44 year old female.    HPI  Here for annual physical   Also f/u asthma   Using albuterol few times a week needs controlled inhaler   Also has vaginal itching and was on recent antibiotic  History/Other:   Review of Systems    Constitutional: Negative.  Negative for activity change, appetite change, diaphoresis and fatigue.     Respiratory: see hpi   Cardiovascular: Negative.  Negative for chest pain, palpitations and leg swelling.   Gastrointestinal: Negative.  Negative for abdominal pain.   Skin: Negative.           Psychiatric/Behavioral: Negative.        Current Outpatient Medications   Medication Sig Dispense Refill    albuterol (PROAIR HFA) 108 (90 Base) MCG/ACT Inhalation Aero Soln Inhale 2 puffs into the lungs every 4 (four) hours as needed for Wheezing. 3 each 1    fluticasone propionate (FLOVENT HFA) 110 MCG/ACT Inhalation Aerosol Inhale 2 puffs into the lungs 2 (two) times daily. 12 each 1    fluconazole (DIFLUCAN) 150 MG Oral Tab Take 1 tablet (150 mg total) by mouth once for 1 dose. 1 tablet 0    ibuprofen 800 MG Oral Tab Take 1 tablet (800 mg total) by mouth every 8 (eight) hours as needed for Pain. 15 tablet 0     Allergies:Allergies[1]    Objective:   Physical Exam  Constitutional:       Appearance: She is well-developed.   Cardiovascular:      Rate and Rhythm: Normal rate and regular rhythm.      Heart sounds: Normal heart sounds.   Pulmonary:      Effort: Pulmonary effort is normal.      Breath sounds: Normal breath sounds.   Abdominal:      General: Bowel sounds are normal.      Palpations: Abdomen is soft.   Skin:     General: Skin is warm.   Neurological:      Mental Status: She is alert.      Deep Tendon Reflexes: Reflexes are normal and symmetric.         Assessment & Plan:   1. Screening for colon cancer    2. Screening mammogram for breast cancer    3. Annual physical exam    Asthma- start flovent   F/u in 1-2 months   Vaginitis - diflucan    F/u prn     Orders Placed This Encounter   Procedures    Lipid Panel    Occult Blood, Fecal, FIT Immunoassay    Fluzone trivalent vaccine, PF 0.5mL, 6mo+ (84750)       Meds This Visit:  Requested Prescriptions     Signed Prescriptions Disp Refills    albuterol (PROAIR HFA) 108 (90 Base) MCG/ACT Inhalation Aero Soln 3 each 1     Sig: Inhale 2 puffs into the lungs every 4 (four) hours as needed for Wheezing.    fluticasone propionate (FLOVENT HFA) 110 MCG/ACT Inhalation Aerosol 12 each 1     Sig: Inhale 2 puffs into the lungs 2 (two) times daily.    fluconazole (DIFLUCAN) 150 MG Oral Tab 1 tablet 0     Sig: Take 1 tablet (150 mg total) by mouth once for 1 dose.       Imaging & Referrals:  INFLUENZA VACCINE, TRI, PRESERV FREE, 0.5 ML  OP REFERRAL TO [facility] hospital GI TELEPHONE COLON SCREEN  [facility] SIMONA 2D+3D SCREENING BILAT (CPT=77067/57299)         [1]   Allergies  Allergen Reactions    Shrimp SWELLING    Ketorolac OTHER (SEE COMMENTS)     Hand/foot swelling     Sulfa Antibiotics ITCHING

## 2024-11-08 NOTE — TELEPHONE ENCOUNTER
Called pharmacy and they stated the alternatives listed are    Arnuity   Qvar  Asmanex     Please advise which alternative.

## 2024-11-08 NOTE — TELEPHONE ENCOUNTER
Lets get checked physical results forms signed, filled out and faxed to [phone]    Confirmation successful

## 2024-12-07 ENCOUNTER — TELEPHONE (OUTPATIENT)
Dept: FAMILY MEDICINE CLINIC | Facility: CLINIC | Age: 44
End: 2024-12-07

## 2024-12-07 NOTE — TELEPHONE ENCOUNTER
Dr Chapman, please advise    Patient called back and was provided her lipid panel test results after receiving a no response letter.   FYI-Patient has taken atorvastatin in the past and had muscle cramping last year.   There is no order for cholesterol medication on the chart.      Aura Chapman MD  2024  1:57 PM CST       Please call the patient let me know if she is ok with statin  Hello,  Your cholesterol is very elavated . I would like you to start medication as your LDL is quite elevated  It will be also helpful to do more exercise and avoid fried foods       Message received from Answering service:  Name:SATURDAY TRIAGE RN                      2024 11:58:59 AM  ProfileId:    [id number]                   [facility]  Department:[place] ANSWERING SERVICE  ======================================================================  Text Messages    Message # [phone]         2024 11:57a   [[email or web address]]  To:  SATURDAY TRIAGE RN  From:  NIKI KHAN 78  Primary MD:  KEI  Phone#:  [phone]  ----------------------------------------------------------------------  RETURNING THE CALL TO A NURSE

## 2024-12-10 RX ORDER — PRAVASTATIN SODIUM 20 MG
20 TABLET ORAL NIGHTLY
Qty: 90 TABLET | Refills: 1 | Status: SHIPPED | OUTPATIENT
Start: 2024-12-10

## 2024-12-11 NOTE — TELEPHONE ENCOUNTER
Advised patient of Dr. Chapman's note. Patient verbalized understanding.      Aura Chapman MD  Em Rn Caeukx15 hours ago (3:59 PM)     We can try pravastatin  That might work with less side effects   Pharmacy  [facility] #[id number] - [place], [place] - [address] AT [facility], [phone], [phone]      Disp Refills Start End    pravastatin 20 MG Oral Tab 90 tablet 1 12/10/2024 --    Sig - Route: Take 1 tablet (20 mg total) by mouth nightly. (For high cholesterol.) - Oral    Sent to pharmacy as: Pravastatin Sodium 20 MG Oral Tablet (Pravachol)    E-Prescribing Status: Receipt confirmed by pharmacy (12/10/2024  4:00 PM CST)

## 2025-01-22 RX ORDER — ROSUVASTATIN CALCIUM 10 MG/1
10 TABLET, COATED ORAL NIGHTLY
Qty: 90 TABLET | Refills: 3 | Status: SHIPPED | OUTPATIENT
Start: 2025-01-22

## 2025-01-22 NOTE — TELEPHONE ENCOUNTER
Please Review. Protocol Failed; No Protocol   Prescription fails for medication not being active on patient's med list.   * Prescription \"fell off\" list, due to it auto expiring in the system.     Requested Prescriptions   Pending Prescriptions Disp Refills    ROSUVASTATIN 10 MG Oral Tab [Pharmacy Med Name: ROSUVASTATIN CALCIUM 10 MG TAB] 90 tablet 0     Sig: TAKE 1 TABLET (10 MG TOTAL) BY MOUTH EVERY NIGHT FOR CHOLESTEROL       Cholesterol Medication Protocol Failed - 1/22/2025 11:34 AM        Failed - Medication is active on med list        Passed - ALT < 80     Lab Results   Component Value Date    ALT 28 10/27/2024             Passed - ALT resulted within past year        Passed - Lipid panel within past 12 months     Lab Results   Component Value Date    CHOLEST 262 (H) 11/08/2024    TRIG 111 11/08/2024    HDL 44 11/08/2024     (H) 11/08/2024    VLDL 24 11/08/2024    NONHDLC 218 (H) 11/08/2024             Passed - In person appointment or virtual visit in the past 12 mos or appointment in next 3 mos     Recent Outpatient Visits              2 months ago Scre[facility] for colon cancer    [facility]    [facility]    2 months ago Pneumonia due to infectious organism, unspecified laterality, unspecified part of lung    [facility]    [facility]    1 year ago [facility] breast [facility]    [facility]    1 year ago [facility]ermatitis    [facility]    [facility]    2 years ago Annual physical exam    [facility]                               Recent Outpatient Visits              2 months ago Screening for colon cancer    [facility],  [facility] Roseanna Rueda Tanja, MD    Office Visit    2 months ago Pneumonia due to infectious organism, unspecified laterality, unspecified part of lung    [facility] [address] [facility] [facility]    Office Visit    1 year ago Screening mammogram for breast cancer    [facility]    [facility] Visit    1 year ago Dermatitis    [facility]    [facility] Visit    2 years ago [facility]

## 2025-07-13 NOTE — TELEPHONE ENCOUNTER
Refill passed per [facility] protocol.    Requested Prescriptions   Pending Prescriptions Disp Refills    PRAVASTATIN 20 MG Oral Tab [Pharmacy Med Name: PRAVASTATIN SODIUM 20 MG TAB] 90 tablet 1     Sig: Take 1 tablet (20 mg total) by mouth nightly. (For high cholesterol.)       Cholesterol Medication Protocol Passed - 7/13/2025 11:19 AM        Passed - ALT < 80     Lab Results   Component Value Date    ALT 28 10/27/2024             Passed - ALT resulted within past year        Passed - Lipid panel within past 12 months     Lab Results   Component Value Date    CHOLEST 262 (H) 11/08/2024    TRIG 111 11/08/2024    HDL 44 11/08/2024     (H) 11/08/2024    VLDL 24 11/08/2024    NONHDLC 218 (H) 11/08/2024             Passed - In person appointment or virtual visit in the past 12 mos or appointment in next 3 mos     Recent Outpatient Visits              8 months [facility]    [facility]    8 months ago Pneumonia due to infectious organism, unspecified laterality, unspecified part of lung    [facility]    [facility]    1 year ago [facility] mamm[facility] breast cancer    [facility]    [facility]    2 years ago [facility]ermatitis    [facility]    [facility]    2 years ago Annual physical exam    [facility]    [facility]                      Passed - Medication is active on med list                 Recent Outpatient Visits              8 months ago [facility] colon [facility]    [facility] Visit    8 months ago Pneumonia due to infectious organism, unspecified laterality, unspecified part  of lung    [facility], [address], Zoë Velasquez APRN    Office Visit    1 year ago Screening mammogram for breast cancer    [facility], [facility], Aura Mendosa MD    Office Visit    2 years ago Dermatitis    [facility], [facility], Aura Mendosa MD    Office Visit    2 years ago Annual physical exam    [facility], [facility], Aura Mendosa MD    Office Visit

## 2025-07-15 RX ORDER — PRAVASTATIN SODIUM 20 MG
20 TABLET ORAL NIGHTLY
Qty: 90 TABLET | Refills: 3 | Status: SHIPPED | OUTPATIENT
Start: 2025-07-15

## 2025-07-15 NOTE — TELEPHONE ENCOUNTER
Refill Per Protocol     Requested Prescriptions   Pending Prescriptions Disp Refills    PRAVASTATIN 20 MG Oral Tab [Pharmacy Med Name: PRAVASTATIN SODIUM 20 MG TAB] 90 tablet 1     Sig: Take 1 tablet (20 mg total) by mouth nightly. (For high cholesterol.)       Cholesterol Medication Protocol Passed - 7/15/2025  2:18 PM        Passed - ALT < 80     Lab Results   Component Value Date    ALT 28 10/27/2024             Passed - ALT resulted within past year        Passed - Lipid panel within past 12 months     Lab Results   Component Value Date    CHOLEST 262 (H) 11/08/2024    TRIG 111 11/08/2024    HDL 44 11/08/2024     (H) 11/08/2024    VLDL 24 11/08/2024    NONHDLC 218 (H) 11/08/2024             Passed - In person appointment or virtual visit in the past 12 mos or appointment in next 3 mos     Recent Outpatient Visits              8 months ago Scre[facility] for colon cancer    [facility]    [facility] Visit    8 months ago Pneumonia due to infectious organism, unspecified laterality, unspecified part of lung    [facility] [address] [facility]    Office Visit    1 year ago Screening mammogram for breast cancer    [facility]    Office Visit    2 years ago Dermatitis    [facility]    Office Visit    2 years ago Annual physical exam    [facility]    [facility]                      Passed - Medication is active on med list

## (undated) NOTE — LETTER
November 11, 2019     Brady Mcbride  [address] [place] [address]      Dear Nasima Backer:    Below are the results from your recent visit:    Resulted Orders   COMP METABOLIC PANEL (14)   Result Value Ref Range    Glucose 91 70 - 99 mg/dL Immature Granulocyte Absolute 0.01 0.00 - 1.00 x10(3) uL    Neutrophil % 49.1 %    Lymphocyte % 36.9 %    Monocyte % 7.6 %    Eosinophil % 5.2 %    Basophil % 1.0 %    Immature Granulocyte % 0.2 %     The test results are normal. Continue present manageme

## (undated) NOTE — LETTER
ASTHMA ACTION PLAN for Denisse Palacios     : 3/12/1980     Date: 11/10/21  Doctor:   Selvin Gautam MD  [facility] or [phone]      ACT Score: Breath Activated    Inhale 2 puffs into the lungs 2 (two) times daily. Leukotriene Modulators Instructions     montelukast 10 MG Oral Tab    Take 1 tablet (10 mg total) by mouth daily.     Sympathomimetics Instructions     Albuterol Sulfate HFA (PROAIR H

## (undated) NOTE — MR AVS SNAPSHOT
After Visit Summary   11/8/2019    Rudy Saldaña    MRN: [id number]           Visit Information     Date & Time  11/8/2019 11:30 AM Provider  Beau Oconnor MD [address] [place] [address] Dept.  Phone  [phone] will not need to use this code after you have completed the sign-up process. If you do not sign up before the expiration date, you must request a new code. [employer] Activation Code: [email or web address]  Expires: 1/7/2020  1:10 PM    4.  Enter your Zip Code and Da Tips for increasing your physical activity – Adults who are physically active are less likely to develop some chronic diseases than adults who are inactive.      HOW TO GET STARTED: HOW TO STAY MOTIVATED:   Start activities slowly and build up over time Do online. The physician will respond and provide a treatment plan within a few hours.            Treatment for mild   illness or injury that does   not require immediate attention   VIDEO VISITS  Average cost  $35*    e-VISITS  Average cost  $35*      [facility]